# Patient Record
Sex: MALE | Race: WHITE | HISPANIC OR LATINO | Employment: FULL TIME | URBAN - METROPOLITAN AREA
[De-identification: names, ages, dates, MRNs, and addresses within clinical notes are randomized per-mention and may not be internally consistent; named-entity substitution may affect disease eponyms.]

---

## 2023-05-22 LAB
ALANINE AMINOTRANSFERASE (SGPT) (U/L) IN SER/PLAS: 24 U/L (ref 10–52)
ALBUMIN (G/DL) IN SER/PLAS: 4.1 G/DL (ref 3.4–5)
ALKALINE PHOSPHATASE (U/L) IN SER/PLAS: 66 U/L (ref 33–136)
ANION GAP IN SER/PLAS: 9 MMOL/L (ref 10–20)
ASPARTATE AMINOTRANSFERASE (SGOT) (U/L) IN SER/PLAS: 20 U/L (ref 9–39)
BASOPHILS (10*3/UL) IN BLOOD BY AUTOMATED COUNT: 0.09 X10E9/L (ref 0–0.1)
BASOPHILS/100 LEUKOCYTES IN BLOOD BY AUTOMATED COUNT: 1.3 % (ref 0–2)
BILIRUBIN TOTAL (MG/DL) IN SER/PLAS: 1.2 MG/DL (ref 0–1.2)
CALCIUM (MG/DL) IN SER/PLAS: 9 MG/DL (ref 8.6–10.3)
CARBON DIOXIDE, TOTAL (MMOL/L) IN SER/PLAS: 27 MMOL/L (ref 21–32)
CHLORIDE (MMOL/L) IN SER/PLAS: 103 MMOL/L (ref 98–107)
CHOLESTEROL (MG/DL) IN SER/PLAS: 139 MG/DL (ref 0–199)
CHOLESTEROL IN HDL (MG/DL) IN SER/PLAS: 56.6 MG/DL
CHOLESTEROL/HDL RATIO: 2.5
CREATININE (MG/DL) IN SER/PLAS: 1.02 MG/DL (ref 0.5–1.3)
EOSINOPHILS (10*3/UL) IN BLOOD BY AUTOMATED COUNT: 0.26 X10E9/L (ref 0–0.4)
EOSINOPHILS/100 LEUKOCYTES IN BLOOD BY AUTOMATED COUNT: 3.8 % (ref 0–6)
ERYTHROCYTE DISTRIBUTION WIDTH (RATIO) BY AUTOMATED COUNT: 13.1 % (ref 11.5–14.5)
ERYTHROCYTE MEAN CORPUSCULAR HEMOGLOBIN CONCENTRATION (G/DL) BY AUTOMATED: 32.7 G/DL (ref 32–36)
ERYTHROCYTE MEAN CORPUSCULAR VOLUME (FL) BY AUTOMATED COUNT: 86 FL (ref 80–100)
ERYTHROCYTES (10*6/UL) IN BLOOD BY AUTOMATED COUNT: 4.96 X10E12/L (ref 4.5–5.9)
ESTIMATED AVERAGE GLUCOSE FOR HBA1C: 146 MG/DL
GFR MALE: 77 ML/MIN/1.73M2
GLUCOSE (MG/DL) IN SER/PLAS: 164 MG/DL (ref 74–99)
HEMATOCRIT (%) IN BLOOD BY AUTOMATED COUNT: 42.8 % (ref 41–52)
HEMOGLOBIN (G/DL) IN BLOOD: 14 G/DL (ref 13.5–17.5)
HEMOGLOBIN A1C/HEMOGLOBIN TOTAL IN BLOOD: 6.7 %
IMMATURE GRANULOCYTES/100 LEUKOCYTES IN BLOOD BY AUTOMATED COUNT: 0.3 % (ref 0–0.9)
LDL: 62 MG/DL (ref 0–99)
LEUKOCYTES (10*3/UL) IN BLOOD BY AUTOMATED COUNT: 6.8 X10E9/L (ref 4.4–11.3)
LYMPHOCYTES (10*3/UL) IN BLOOD BY AUTOMATED COUNT: 2.19 X10E9/L (ref 0.8–3)
LYMPHOCYTES/100 LEUKOCYTES IN BLOOD BY AUTOMATED COUNT: 32.2 % (ref 13–44)
MONOCYTES (10*3/UL) IN BLOOD BY AUTOMATED COUNT: 0.61 X10E9/L (ref 0.05–0.8)
MONOCYTES/100 LEUKOCYTES IN BLOOD BY AUTOMATED COUNT: 9 % (ref 2–10)
NEUTROPHILS (10*3/UL) IN BLOOD BY AUTOMATED COUNT: 3.64 X10E9/L (ref 1.6–5.5)
NEUTROPHILS/100 LEUKOCYTES IN BLOOD BY AUTOMATED COUNT: 53.4 % (ref 40–80)
NRBC (PER 100 WBCS) BY AUTOMATED COUNT: 0 /100 WBC (ref 0–0)
PLATELETS (10*3/UL) IN BLOOD AUTOMATED COUNT: 209 X10E9/L (ref 150–450)
POTASSIUM (MMOL/L) IN SER/PLAS: 4.1 MMOL/L (ref 3.5–5.3)
PROSTATE SPECIFIC AG (NG/ML) IN SER/PLAS: 4.95 NG/ML (ref 0–4)
PROTEIN TOTAL: 7.2 G/DL (ref 6.4–8.2)
SODIUM (MMOL/L) IN SER/PLAS: 135 MMOL/L (ref 136–145)
TRIGLYCERIDE (MG/DL) IN SER/PLAS: 100 MG/DL (ref 0–149)
UREA NITROGEN (MG/DL) IN SER/PLAS: 17 MG/DL (ref 6–23)
VLDL: 20 MG/DL (ref 0–40)

## 2023-11-14 DIAGNOSIS — E11.9 TYPE 2 DIABETES MELLITUS WITHOUT COMPLICATION, WITHOUT LONG-TERM CURRENT USE OF INSULIN (MULTI): Primary | ICD-10-CM

## 2023-11-24 RX ORDER — METFORMIN HYDROCHLORIDE 500 MG/1
1000 TABLET ORAL 2 TIMES DAILY
Qty: 90 TABLET | Refills: 1 | Status: SHIPPED | OUTPATIENT
Start: 2023-11-24 | End: 2024-01-02 | Stop reason: SDUPTHER

## 2023-11-28 ENCOUNTER — OFFICE VISIT (OUTPATIENT)
Dept: PRIMARY CARE | Facility: CLINIC | Age: 73
End: 2023-11-28
Payer: MEDICARE

## 2023-11-28 ENCOUNTER — LAB (OUTPATIENT)
Dept: LAB | Facility: LAB | Age: 73
End: 2023-11-28
Payer: MEDICARE

## 2023-11-28 VITALS
HEIGHT: 68 IN | WEIGHT: 247.8 LBS | TEMPERATURE: 97.8 F | DIASTOLIC BLOOD PRESSURE: 79 MMHG | BODY MASS INDEX: 37.56 KG/M2 | SYSTOLIC BLOOD PRESSURE: 134 MMHG | HEART RATE: 68 BPM

## 2023-11-28 DIAGNOSIS — E66.01 OBESITY, MORBID (MULTI): ICD-10-CM

## 2023-11-28 DIAGNOSIS — E11.9 TYPE 2 DIABETES MELLITUS WITHOUT COMPLICATION, WITHOUT LONG-TERM CURRENT USE OF INSULIN (MULTI): ICD-10-CM

## 2023-11-28 DIAGNOSIS — N40.1 BPH WITH OBSTRUCTION/LOWER URINARY TRACT SYMPTOMS: ICD-10-CM

## 2023-11-28 DIAGNOSIS — Z00.00 WELL ADULT HEALTH CHECK: ICD-10-CM

## 2023-11-28 DIAGNOSIS — G62.89 OTHER POLYNEUROPATHY: ICD-10-CM

## 2023-11-28 DIAGNOSIS — E78.5 HYPERLIPIDEMIA, UNSPECIFIED HYPERLIPIDEMIA TYPE: ICD-10-CM

## 2023-11-28 DIAGNOSIS — K21.9 GERD WITHOUT ESOPHAGITIS: ICD-10-CM

## 2023-11-28 DIAGNOSIS — N13.8 BPH WITH OBSTRUCTION/LOWER URINARY TRACT SYMPTOMS: ICD-10-CM

## 2023-11-28 DIAGNOSIS — I73.9 PAD (PERIPHERAL ARTERY DISEASE) (CMS-HCC): ICD-10-CM

## 2023-11-28 DIAGNOSIS — M72.0 DUPUYTREN CONTRACTURE: ICD-10-CM

## 2023-11-28 DIAGNOSIS — R97.20 ELEVATED PSA: ICD-10-CM

## 2023-11-28 DIAGNOSIS — I10 PRIMARY HYPERTENSION: Primary | ICD-10-CM

## 2023-11-28 PROBLEM — K29.40 ATROPHIC GASTRITIS: Status: ACTIVE | Noted: 2023-11-28

## 2023-11-28 PROBLEM — D64.9 ANEMIA: Status: ACTIVE | Noted: 2023-11-28

## 2023-11-28 PROBLEM — N32.81 OAB (OVERACTIVE BLADDER): Status: ACTIVE | Noted: 2017-03-06

## 2023-11-28 PROBLEM — R33.9 URINE RETENTION: Status: ACTIVE | Noted: 2023-11-28

## 2023-11-28 PROBLEM — R07.81 PLEURODYNIA: Status: ACTIVE | Noted: 2023-11-28

## 2023-11-28 PROBLEM — R31.0 GROSS HEMATURIA: Status: ACTIVE | Noted: 2017-06-14

## 2023-11-28 PROBLEM — R39.16 STRAINING TO VOID: Status: ACTIVE | Noted: 2023-11-28

## 2023-11-28 PROBLEM — G62.9 PERIPHERAL NEUROPATHY: Status: ACTIVE | Noted: 2023-11-28

## 2023-11-28 LAB
ALBUMIN SERPL BCP-MCNC: 4.4 G/DL (ref 3.4–5)
ALP SERPL-CCNC: 89 U/L (ref 33–136)
ALT SERPL W P-5'-P-CCNC: 19 U/L (ref 10–52)
ANION GAP SERPL CALC-SCNC: 14 MMOL/L (ref 10–20)
AST SERPL W P-5'-P-CCNC: 14 U/L (ref 9–39)
BASOPHILS # BLD AUTO: 0.09 X10*3/UL (ref 0–0.1)
BASOPHILS NFR BLD AUTO: 1.1 %
BILIRUB SERPL-MCNC: 0.9 MG/DL (ref 0–1.2)
BUN SERPL-MCNC: 16 MG/DL (ref 6–23)
CALCIUM SERPL-MCNC: 9.5 MG/DL (ref 8.6–10.3)
CHLORIDE SERPL-SCNC: 100 MMOL/L (ref 98–107)
CHOLEST SERPL-MCNC: 140 MG/DL (ref 0–199)
CHOLESTEROL/HDL RATIO: 3.1
CO2 SERPL-SCNC: 26 MMOL/L (ref 21–32)
CREAT SERPL-MCNC: 1.08 MG/DL (ref 0.5–1.3)
EOSINOPHIL # BLD AUTO: 0.36 X10*3/UL (ref 0–0.4)
EOSINOPHIL NFR BLD AUTO: 4.4 %
ERYTHROCYTE [DISTWIDTH] IN BLOOD BY AUTOMATED COUNT: 12.9 % (ref 11.5–14.5)
EST. AVERAGE GLUCOSE BLD GHB EST-MCNC: 154 MG/DL
GFR SERPL CREATININE-BSD FRML MDRD: 72 ML/MIN/1.73M*2
GLUCOSE SERPL-MCNC: 153 MG/DL (ref 74–99)
HBA1C MFR BLD: 7 %
HCT VFR BLD AUTO: 44.6 % (ref 41–52)
HDLC SERPL-MCNC: 44.7 MG/DL
HGB BLD-MCNC: 14.7 G/DL (ref 13.5–17.5)
IMM GRANULOCYTES # BLD AUTO: 0.02 X10*3/UL (ref 0–0.5)
IMM GRANULOCYTES NFR BLD AUTO: 0.2 % (ref 0–0.9)
LDLC SERPL CALC-MCNC: 68 MG/DL
LYMPHOCYTES # BLD AUTO: 2.68 X10*3/UL (ref 0.8–3)
LYMPHOCYTES NFR BLD AUTO: 32.9 %
MCH RBC QN AUTO: 28.2 PG (ref 26–34)
MCHC RBC AUTO-ENTMCNC: 33 G/DL (ref 32–36)
MCV RBC AUTO: 86 FL (ref 80–100)
MONOCYTES # BLD AUTO: 0.75 X10*3/UL (ref 0.05–0.8)
MONOCYTES NFR BLD AUTO: 9.2 %
NEUTROPHILS # BLD AUTO: 4.24 X10*3/UL (ref 1.6–5.5)
NEUTROPHILS NFR BLD AUTO: 52.2 %
NON HDL CHOLESTEROL: 95 MG/DL (ref 0–149)
NRBC BLD-RTO: 0 /100 WBCS (ref 0–0)
PLATELET # BLD AUTO: 232 X10*3/UL (ref 150–450)
POTASSIUM SERPL-SCNC: 4.4 MMOL/L (ref 3.5–5.3)
PROT SERPL-MCNC: 7.6 G/DL (ref 6.4–8.2)
RBC # BLD AUTO: 5.21 X10*6/UL (ref 4.5–5.9)
SODIUM SERPL-SCNC: 136 MMOL/L (ref 136–145)
TRIGL SERPL-MCNC: 139 MG/DL (ref 0–149)
VLDL: 28 MG/DL (ref 0–40)
WBC # BLD AUTO: 8.1 X10*3/UL (ref 4.4–11.3)

## 2023-11-28 PROCEDURE — 3075F SYST BP GE 130 - 139MM HG: CPT | Performed by: INTERNAL MEDICINE

## 2023-11-28 PROCEDURE — 83036 HEMOGLOBIN GLYCOSYLATED A1C: CPT

## 2023-11-28 PROCEDURE — 3008F BODY MASS INDEX DOCD: CPT | Performed by: INTERNAL MEDICINE

## 2023-11-28 PROCEDURE — 1159F MED LIST DOCD IN RCRD: CPT | Performed by: INTERNAL MEDICINE

## 2023-11-28 PROCEDURE — 99214 OFFICE O/P EST MOD 30 MIN: CPT | Performed by: INTERNAL MEDICINE

## 2023-11-28 PROCEDURE — 84154 ASSAY OF PSA FREE: CPT

## 2023-11-28 PROCEDURE — 1036F TOBACCO NON-USER: CPT | Performed by: INTERNAL MEDICINE

## 2023-11-28 PROCEDURE — 3078F DIAST BP <80 MM HG: CPT | Performed by: INTERNAL MEDICINE

## 2023-11-28 PROCEDURE — 84153 ASSAY OF PSA TOTAL: CPT

## 2023-11-28 PROCEDURE — 3044F HG A1C LEVEL LT 7.0%: CPT | Performed by: INTERNAL MEDICINE

## 2023-11-28 PROCEDURE — 36415 COLL VENOUS BLD VENIPUNCTURE: CPT

## 2023-11-28 PROCEDURE — 85025 COMPLETE CBC W/AUTO DIFF WBC: CPT

## 2023-11-28 PROCEDURE — 80053 COMPREHEN METABOLIC PANEL: CPT

## 2023-11-28 PROCEDURE — 80061 LIPID PANEL: CPT

## 2023-11-28 RX ORDER — TAMSULOSIN HYDROCHLORIDE 0.4 MG/1
0.4 CAPSULE ORAL DAILY
COMMUNITY
End: 2024-01-02 | Stop reason: SDUPTHER

## 2023-11-28 RX ORDER — ATORVASTATIN CALCIUM 10 MG/1
10 TABLET, FILM COATED ORAL DAILY
COMMUNITY
End: 2024-01-02 | Stop reason: SDUPTHER

## 2023-11-28 RX ORDER — BLOOD SUGAR DIAGNOSTIC
STRIP MISCELLANEOUS
COMMUNITY
End: 2024-02-19 | Stop reason: SDUPTHER

## 2023-11-28 RX ORDER — ACETAMINOPHEN 325 MG/1
325 TABLET ORAL EVERY 8 HOURS PRN
COMMUNITY

## 2023-11-28 RX ORDER — GLIMEPIRIDE 2 MG/1
2 TABLET ORAL DAILY
COMMUNITY
End: 2024-03-04 | Stop reason: SDUPTHER

## 2023-11-28 RX ORDER — AMLODIPINE BESYLATE 5 MG/1
5 TABLET ORAL DAILY
COMMUNITY
End: 2024-01-02 | Stop reason: SDUPTHER

## 2023-11-28 RX ORDER — GABAPENTIN 300 MG/1
300 CAPSULE ORAL 3 TIMES DAILY
COMMUNITY
End: 2024-01-02 | Stop reason: SDUPTHER

## 2023-11-28 ASSESSMENT — PATIENT HEALTH QUESTIONNAIRE - PHQ9
1. LITTLE INTEREST OR PLEASURE IN DOING THINGS: NOT AT ALL
SUM OF ALL RESPONSES TO PHQ9 QUESTIONS 1 AND 2: 0
2. FEELING DOWN, DEPRESSED OR HOPELESS: NOT AT ALL

## 2023-11-28 NOTE — PROGRESS NOTES
Assessment/Plan   Problem List Items Addressed This Visit       BPH with obstruction/lower urinary tract symptoms    Diabetes mellitus (CMS/[facility])    Relevant Orders    Hemoglobin A1C    Dupuytren contracture    Elevated PSA    Relevant Orders    PSA, Total and Free    GERD without esophagitis    Relevant Orders    CBC and Auto Differential    Comprehensive Metabolic Panel    Hyperlipidemia    Relevant Orders    Comprehensive Metabolic Panel    Lipid Panel    Hypertension - Primary    Peripheral neuropathy    Relevant Orders    Comprehensive Metabolic Panel    Obesity, morbid (CMS/HCC)    Relevant Orders    Comprehensive Metabolic Panel    Well adult health check    Relevant Orders    Comprehensive Metabolic Panel    BMI 38.0-38.9,adult   Advised to have a blood work which is due he was supposed to get it done but did not get it done  He has been doing well we discussed all the current conditions his Dupuytren contracture is a stable  His peripheral neuropathic symptoms are under control with the current treatment  He has not been to the eye doctor discussed the eye exam for the diabetes purpose is very important  His BMI is elevated but the weight has not gone up rather than down this is a reasonable thing to do  His PSA is elevated and a year ago it was 12 or so but came down to four-point something that is good and therefore we will repeat the PSA to assess  Follow-up in 4 to 5 months unless otherwise indicated by the blood test  No change in the medications    Subjective   Patient ID: Kameron Candelaria is a 73 y.o. male who presents for Follow-up.    Past Surgical History:   Procedure Laterality Date    OTHER SURGICAL HISTORY  02/06/2022    Colonoscopy    OTHER SURGICAL HISTORY  01/25/2022    Hand surgery      No family history on file.   Social History     Socioeconomic History    Marital status:      Spouse name: Not on file    Number of children: Not on file    Years of education: Not on file    Highest  "education level: Not on file   Occupational History    Not on file   Tobacco Use    Smoking status: Never    Smokeless tobacco: Never   Substance and Sexual Activity    Alcohol use: Not Currently    Drug use: Never    Sexual activity: Not on file   Other Topics Concern    Not on file   Social History Narrative    Not on file     Social Determinants of Health     Financial Resource Strain: Not on file   Food Insecurity: Not on file   Transportation Needs: Not on file   Physical Activity: Not on file   Stress: Not on file   Social Connections: Not on file   Intimate Partner Violence: Not on file   Housing Stability: Not on file      Patient has no known allergies.   Current Outpatient Medications   Medication Sig Dispense Refill    metFORMIN (Glucophage) 500 mg tablet TAKE TWO TABLETS BY MOUTH TWO TIMES A DAY 90 tablet 1    acetaminophen (TylenoL) 325 mg tablet Take 1 tablet (325 mg) by mouth every 8 hours if needed for moderate pain (4 - 6).      amLODIPine (Norvasc) 5 mg tablet Take 1 tablet (5 mg) by mouth once daily.      atorvastatin (Lipitor) 10 mg tablet Take 1 tablet (10 mg) by mouth once daily.      gabapentin (Neurontin) 300 mg capsule Take 1 capsule (300 mg) by mouth 3 times a day.      glimepiride (Amaryl) 2 mg tablet Take 1 tablet (2 mg) by mouth once daily.      OneTouch Ultra Test strip once daily.      tamsulosin (Flomax) 0.4 mg 24 hr capsule Take 1 capsule (0.4 mg) by mouth once daily.       No current facility-administered medications for this visit.      Vitals:    11/28/23 0846   BP: 134/79   BP Location: Left arm   Patient Position: Sitting   Pulse: 68   Temp: 36.6 °C (97.8 °F)   Weight: 112 kg (247 lb 12.8 oz)   Height: 1.715 m (5' 7.5\")      Problem List Items Addressed This Visit       BPH with obstruction/lower urinary tract symptoms    Diabetes mellitus (CMS/HCC)    Relevant Orders    Hemoglobin A1C    Dupuytren contracture    Elevated PSA    Relevant Orders    PSA, Total and Free    GERD " "without esophagitis    Relevant Orders    CBC and Auto Differential    Comprehensive Metabolic Panel    Hyperlipidemia    Relevant Orders    Comprehensive Metabolic Panel    Lipid Panel    Hypertension - Primary    Peripheral neuropathy    Relevant Orders    Comprehensive Metabolic Panel    Obesity, morbid (CMS/HCC)    Relevant Orders    Comprehensive Metabolic Panel    Well adult health check    Relevant Orders    Comprehensive Metabolic Panel    BMI 38.0-38.9,adult      Orders Placed This Encounter   Procedures    CBC and Auto Differential     Standing Status:   Future     Standing Expiration Date:   11/28/2024     Order Specific Question:   Release result to MyChart     Answer:   Immediate    Comprehensive Metabolic Panel     Standing Status:   Future     Standing Expiration Date:   11/28/2024     Order Specific Question:   Release result to MyChart     Answer:   Immediate    Hemoglobin A1C     Standing Status:   Future     Standing Expiration Date:   11/28/2024     Order Specific Question:   Release result to MyChart     Answer:   Immediate    Lipid Panel     Standing Status:   Future     Standing Expiration Date:   11/28/2024     Order Specific Question:   Release result to [employer]     Answer:   Immediate    PSA, Total and Free     Standing Status:   Future     Standing Expiration Date:   11/28/2024     Order Specific Question:   Release result to [employer]     Answer:   Immediate [1]        HPI  Came for periodic review and follow-up  No complaint  Things under control  Has not been to the eye doctor      ROS negative    PHYSICAL EXAM  Dupuytren contracture in both palmar aspect unchanged  Cardiovascular chest abdominal neurological examination is normal  Previous labs reviewed  Further labs as ordered      No results found for: \"PR1\", \"BMPR1A\", \"CMPLAS\", \"UR1PHZCM\", \"KPSAT\"   Lab Results   Component Value Date    CHOL 139 05/22/2023    CHHDL 2.5 05/22/2023                "

## 2023-11-30 LAB
PSA FREE MFR SERPL: 22 %
PSA FREE SERPL-MCNC: 1.9 NG/ML
PSA SERPL IA-MCNC: 8.6 NG/ML (ref 0–4)

## 2023-12-06 ENCOUNTER — TELEPHONE (OUTPATIENT)
Dept: PRIMARY CARE | Facility: CLINIC | Age: 73
End: 2023-12-06

## 2023-12-06 NOTE — TELEPHONE ENCOUNTER
----- Message from Griselda Stone MA sent at 12/4/2023 11:13 AM EST -----    ----- Message -----  From: Sebastian Connelly MD  Sent: 11/30/2023   6:05 PM EST  To: [id number] Clinical Support Staff    Tell psa has gone up see urologist

## 2024-01-02 ENCOUNTER — TELEPHONE (OUTPATIENT)
Dept: PRIMARY CARE | Facility: CLINIC | Age: 74
End: 2024-01-02
Payer: COMMERCIAL

## 2024-01-02 DIAGNOSIS — N13.8 BPH WITH OBSTRUCTION/LOWER URINARY TRACT SYMPTOMS: Primary | ICD-10-CM

## 2024-01-02 DIAGNOSIS — N40.1 BPH WITH OBSTRUCTION/LOWER URINARY TRACT SYMPTOMS: Primary | ICD-10-CM

## 2024-01-02 DIAGNOSIS — E78.5 HYPERLIPIDEMIA, UNSPECIFIED HYPERLIPIDEMIA TYPE: ICD-10-CM

## 2024-01-02 DIAGNOSIS — E11.9 TYPE 2 DIABETES MELLITUS WITHOUT COMPLICATION, WITHOUT LONG-TERM CURRENT USE OF INSULIN (MULTI): ICD-10-CM

## 2024-01-02 DIAGNOSIS — I10 PRIMARY HYPERTENSION: ICD-10-CM

## 2024-01-02 RX ORDER — AMLODIPINE BESYLATE 5 MG/1
5 TABLET ORAL DAILY
Qty: 90 TABLET | Refills: 0 | Status: SHIPPED | OUTPATIENT
Start: 2024-01-02 | End: 2024-02-13

## 2024-01-02 RX ORDER — ATORVASTATIN CALCIUM 10 MG/1
10 TABLET, FILM COATED ORAL DAILY
Qty: 90 TABLET | Refills: 0 | Status: SHIPPED | OUTPATIENT
Start: 2024-01-02 | End: 2024-02-13

## 2024-01-02 RX ORDER — METFORMIN HYDROCHLORIDE 500 MG/1
1000 TABLET ORAL 2 TIMES DAILY
Qty: 90 TABLET | Refills: 1 | Status: SHIPPED | OUTPATIENT
Start: 2024-01-02 | End: 2024-02-29 | Stop reason: SDUPTHER

## 2024-01-02 RX ORDER — TAMSULOSIN HYDROCHLORIDE 0.4 MG/1
0.4 CAPSULE ORAL DAILY
Qty: 90 CAPSULE | Refills: 0 | Status: SHIPPED | OUTPATIENT
Start: 2024-01-02 | End: 2024-03-04 | Stop reason: SDUPTHER

## 2024-01-02 RX ORDER — GABAPENTIN 300 MG/1
300 CAPSULE ORAL 3 TIMES DAILY
Qty: 90 CAPSULE | Refills: 0 | Status: SHIPPED | OUTPATIENT
Start: 2024-01-02 | End: 2024-02-01

## 2024-01-02 NOTE — TELEPHONE ENCOUNTER
Pt requesting rx refill for tamsulosin .4mg, amlodipine 5mg, atorvastatin calcium 10mg,  gabapentin 300mg and metformin 500mg to  pharmacy/[facility]. Pt has pending appt 2/19/24.

## 2024-01-18 ENCOUNTER — LAB (OUTPATIENT)
Dept: LAB | Facility: LAB | Age: 74
End: 2024-01-18
Payer: COMMERCIAL

## 2024-01-18 ENCOUNTER — OFFICE VISIT (OUTPATIENT)
Dept: UROLOGY | Facility: CLINIC | Age: 74
End: 2024-01-18
Payer: COMMERCIAL

## 2024-01-18 VITALS
HEART RATE: 76 BPM | WEIGHT: 249 LBS | TEMPERATURE: 97.8 F | BODY MASS INDEX: 38.42 KG/M2 | SYSTOLIC BLOOD PRESSURE: 162 MMHG | DIASTOLIC BLOOD PRESSURE: 85 MMHG

## 2024-01-18 DIAGNOSIS — R97.20 ELEVATED PSA: ICD-10-CM

## 2024-01-18 LAB
CREAT SERPL-MCNC: 1.07 MG/DL (ref 0.5–1.3)
EGFRCR SERPLBLD CKD-EPI 2021: 73 ML/MIN/1.73M*2

## 2024-01-18 PROCEDURE — 3008F BODY MASS INDEX DOCD: CPT | Performed by: UROLOGY

## 2024-01-18 PROCEDURE — 99203 OFFICE O/P NEW LOW 30 MIN: CPT | Performed by: UROLOGY

## 2024-01-18 PROCEDURE — 82565 ASSAY OF CREATININE: CPT

## 2024-01-18 PROCEDURE — 3079F DIAST BP 80-89 MM HG: CPT | Performed by: UROLOGY

## 2024-01-18 PROCEDURE — 3077F SYST BP >= 140 MM HG: CPT | Performed by: UROLOGY

## 2024-01-18 PROCEDURE — 1036F TOBACCO NON-USER: CPT | Performed by: UROLOGY

## 2024-01-18 PROCEDURE — 1159F MED LIST DOCD IN RCRD: CPT | Performed by: UROLOGY

## 2024-01-18 PROCEDURE — 36415 COLL VENOUS BLD VENIPUNCTURE: CPT

## 2024-01-18 NOTE — PROGRESS NOTES
Subjective   Patient ID: Kameron Candelaria is a 73 y.o. male new patient kindly referred by Dr. Sebastian Connelly who presents for Elevated PSA.    HPI  Most recent PSA is 8.6 in November 2023 and has had a value >4 since 2021.  English is not the patient's first language and may not be fully understanding. Once in a while the patient takes some pills (tamsulosin). Patient states his urine flow is fine and denies hematuria, dysuria. He admits he has occasional urgency. He has nocturia every 2-3 hours. When he takes the tamsulosin, then he will have nocturia every 3-4 hours. He states he will take tamsulosin for a few days and then stop taking it for several weeks. He denies a family history of prostate cancer.     He denies a pacemaker or other reason he might not have an MRI.    Past Medical History  Past Medical History:   Diagnosis Date    Benign prostatic hyperplasia with lower urinary tract symptoms     Enlarged prostate with lower urinary tract symptoms (LUTS)    Overweight     Overweight    Personal history of other diseases of urinary system     History of hematuria    Personal history of other endocrine, nutritional and metabolic disease     History of morbid obesity    Personal history of other specified conditions     History of fatigue    Presence of other specified devices 02/07/2022    Indwelling Hull catheter present    Tinea unguium     Onychomycosis of toenail        Surgical History  Past Surgical History:   Procedure Laterality Date    OTHER SURGICAL HISTORY  02/06/2022    Colonoscopy    OTHER SURGICAL HISTORY  01/25/2022    Hand surgery         Social History  He reports that he has never smoked. He has never used smokeless tobacco. He reports that he does not currently use alcohol. He reports that he does not use drugs.    Family History  No family history on file.    Medications    Current Outpatient Medications:     acetaminophen (TylenoL) 325 mg tablet, Take 1 tablet (325 mg) by mouth every 8 hours  if needed for moderate pain (4 - 6)., Disp: , Rfl:     amLODIPine (Norvasc) 5 mg tablet, Take 1 tablet (5 mg) by mouth once daily., Disp: 90 tablet, Rfl: 0    atorvastatin (Lipitor) 10 mg tablet, Take 1 tablet (10 mg) by mouth once daily., Disp: 90 tablet, Rfl: 0    gabapentin (Neurontin) 300 mg capsule, Take 1 capsule (300 mg) by mouth 3 times a day., Disp: 90 capsule, Rfl: 0    glimepiride (Amaryl) 2 mg tablet, Take 1 tablet (2 mg) by mouth once daily., Disp: , Rfl:     metFORMIN (Glucophage) 500 mg tablet, Take 2 tablets (1,000 mg) by mouth 2 times a day., Disp: 90 tablet, Rfl: 1    OneTouch Ultra Test strip, once daily., Disp: , Rfl:     tamsulosin (Flomax) 0.4 mg 24 hr capsule, Take 1 capsule (0.4 mg) by mouth once daily., Disp: 90 capsule, Rfl: 0     Allergies  Patient has no allergy information on record.     Review of Systems  A 12 system review was completed and is negative with the exception of those signs and symptoms noted in the history of present illness.    Objective   Physical Exam  General: in NAD, appears stated age  Head: normocephalic, atraumatic  Neck: supple; trachea is midline  Respiratory: normal effort, no use of accessory muscles  Cardiovascular: no peripheral edema  Abdomen: soft, nondistended, nontender, no rebound or guarding, no organomegaly, no CVA tenderness, no hernia  Lymphatic: no lymphadenopathy noted  Skin: normal turgor, no rashes  Neurologic: grossly intact, oriented to person/place/time  Psychiatric: mode and affect appropriate  : normal phallus, normal meatus, scrotum normal, testicles normal bilaterally, epididymis normal bilaterally  GIANA: normal tone, no hemorrhoids, enlarged prostate smooth/nontender/no nodules    Assessment/Plan     We discussed the role of PSA in screening for prostate cancer. I recommend that we schedule an MRI of the prostate to see if there are any lesions visible. If there is a lesion detected, it will facilitate a targeted biopsy. If there is  nothing found on the MRI, that does not rule out a cancer. We can still investigate further with a general IO biopsy. I recommended that he take the tamsulosin daily to reduce his urinary symptoms. Schedule MRI and follow-up with results.     Scribe Attestation  By signing my name below, I, Cherie Tim   attest that this documentation has been prepared under the direction and in the presence of Wilfredo Reyes MD.

## 2024-01-19 ENCOUNTER — TELEPHONE (OUTPATIENT)
Dept: CARDIOLOGY | Facility: CLINIC | Age: 74
End: 2024-01-19
Payer: COMMERCIAL

## 2024-02-07 ENCOUNTER — HOSPITAL ENCOUNTER (OUTPATIENT)
Dept: RADIOLOGY | Facility: CLINIC | Age: 74
Discharge: HOME | End: 2024-02-07
Payer: COMMERCIAL

## 2024-02-07 DIAGNOSIS — R97.20 ELEVATED PSA: ICD-10-CM

## 2024-02-09 DIAGNOSIS — E11.9 TYPE 2 DIABETES MELLITUS WITHOUT COMPLICATION, WITHOUT LONG-TERM CURRENT USE OF INSULIN (MULTI): ICD-10-CM

## 2024-02-09 DIAGNOSIS — E78.5 HYPERLIPIDEMIA, UNSPECIFIED HYPERLIPIDEMIA TYPE: ICD-10-CM

## 2024-02-09 DIAGNOSIS — I10 PRIMARY HYPERTENSION: ICD-10-CM

## 2024-02-13 RX ORDER — AMLODIPINE BESYLATE 5 MG/1
5 TABLET ORAL DAILY
Qty: 90 TABLET | Refills: 1 | Status: SHIPPED | OUTPATIENT
Start: 2024-02-13 | End: 2024-03-04 | Stop reason: SDUPTHER

## 2024-02-13 RX ORDER — ATORVASTATIN CALCIUM 10 MG/1
10 TABLET, FILM COATED ORAL DAILY
Qty: 90 TABLET | Refills: 1 | Status: SHIPPED | OUTPATIENT
Start: 2024-02-13 | End: 2024-03-04 | Stop reason: SDUPTHER

## 2024-02-19 ENCOUNTER — OFFICE VISIT (OUTPATIENT)
Dept: PRIMARY CARE | Facility: CLINIC | Age: 74
End: 2024-02-19
Payer: COMMERCIAL

## 2024-02-19 VITALS
TEMPERATURE: 97.2 F | WEIGHT: 252 LBS | DIASTOLIC BLOOD PRESSURE: 70 MMHG | HEART RATE: 72 BPM | BODY MASS INDEX: 38.19 KG/M2 | SYSTOLIC BLOOD PRESSURE: 134 MMHG | HEIGHT: 68 IN

## 2024-02-19 DIAGNOSIS — E11.9 TYPE 2 DIABETES MELLITUS WITHOUT COMPLICATION, WITHOUT LONG-TERM CURRENT USE OF INSULIN (MULTI): ICD-10-CM

## 2024-02-19 DIAGNOSIS — G62.89 OTHER POLYNEUROPATHY: ICD-10-CM

## 2024-02-19 DIAGNOSIS — E78.5 HYPERLIPIDEMIA, UNSPECIFIED HYPERLIPIDEMIA TYPE: ICD-10-CM

## 2024-02-19 DIAGNOSIS — R97.20 ELEVATED PSA: ICD-10-CM

## 2024-02-19 DIAGNOSIS — I10 PRIMARY HYPERTENSION: Primary | ICD-10-CM

## 2024-02-19 DIAGNOSIS — Z00.00 WELL ADULT HEALTH CHECK: ICD-10-CM

## 2024-02-19 DIAGNOSIS — E66.01 OBESITY, MORBID (MULTI): ICD-10-CM

## 2024-02-19 PROBLEM — B35.1 ONYCHOMYCOSIS OF TOENAIL: Status: ACTIVE | Noted: 2024-02-19

## 2024-02-19 PROBLEM — Z86.39 HISTORY OF OBESITY: Status: ACTIVE | Noted: 2024-02-19

## 2024-02-19 PROBLEM — E66.3 OVERWEIGHT: Status: ACTIVE | Noted: 2024-02-19

## 2024-02-19 PROCEDURE — 1036F TOBACCO NON-USER: CPT | Performed by: INTERNAL MEDICINE

## 2024-02-19 PROCEDURE — 99214 OFFICE O/P EST MOD 30 MIN: CPT | Performed by: INTERNAL MEDICINE

## 2024-02-19 PROCEDURE — 3008F BODY MASS INDEX DOCD: CPT | Performed by: INTERNAL MEDICINE

## 2024-02-19 PROCEDURE — 3075F SYST BP GE 130 - 139MM HG: CPT | Performed by: INTERNAL MEDICINE

## 2024-02-19 PROCEDURE — 3078F DIAST BP <80 MM HG: CPT | Performed by: INTERNAL MEDICINE

## 2024-02-19 PROCEDURE — 1159F MED LIST DOCD IN RCRD: CPT | Performed by: INTERNAL MEDICINE

## 2024-02-19 RX ORDER — BLOOD SUGAR DIAGNOSTIC
1 STRIP MISCELLANEOUS DAILY
Qty: 100 EACH | Refills: 1 | Status: SHIPPED | OUTPATIENT
Start: 2024-02-19

## 2024-02-19 RX ORDER — LANCETS 33 GAUGE
1 EACH MISCELLANEOUS DAILY
Qty: 100 EACH | Refills: 1 | Status: SHIPPED | OUTPATIENT
Start: 2024-02-19

## 2024-02-19 RX ORDER — LANCETS 33 GAUGE
1 EACH MISCELLANEOUS 2 TIMES DAILY
COMMUNITY
End: 2024-02-19 | Stop reason: SDUPTHER

## 2024-02-19 ASSESSMENT — PATIENT HEALTH QUESTIONNAIRE - PHQ9
SUM OF ALL RESPONSES TO PHQ9 QUESTIONS 1 AND 2: 0
2. FEELING DOWN, DEPRESSED OR HOPELESS: NOT AT ALL
1. LITTLE INTEREST OR PLEASURE IN DOING THINGS: NOT AT ALL

## 2024-02-19 NOTE — PROGRESS NOTES
Assessment/Plan   Problem List Items Addressed This Visit       Diabetes mellitus (CMS/HCC)    Relevant Medications    OneTouch Ultra Test strip    lancets 33 gauge misc    Other Relevant Orders    Comprehensive Metabolic Panel    Hemoglobin A1C    Elevated PSA    Hyperlipidemia    Relevant Orders    Comprehensive Metabolic Panel    Lipid Panel    TSH with reflex to Free T4 if abnormal    Hypertension - Primary    Peripheral neuropathy    Obesity, morbid (CMS/HCC)    Relevant Orders    Comprehensive Metabolic Panel    TSH with reflex to Free T4 if abnormal    Well adult health check    Relevant Orders    Comprehensive Metabolic Panel   His peripheral neuropathy has been not very well responding to the current dose of gabapentin  I advised to seek neurologist opinion and EMG may be needed  There will be issue associated with cervical radiculopathy and lumbar radiculopathy along with a peripheral polyneuropathy with the diabetes  He did not want to go and see any other doctor want to continue the current treatment  He will try to do diet and exercise and lose some weight that will be helpful in all modalities  His hypertension is under control  He has elevated PSA and is being taken care of by urologist and MRI is being planned  His diabetes comprehensive care was discussed  Labs in 3 months and follow-up    Subjective   Patient ID: Kameron Candelaria is a 73 y.o. male who presents for Follow-up.    Past Surgical History:   Procedure Laterality Date    OTHER SURGICAL HISTORY  02/06/2022    Colonoscopy    OTHER SURGICAL HISTORY  01/25/2022    Hand surgery      Family History   Problem Relation Name Age of Onset    No Known Problems Mother      No Known Problems Father        Social History     Socioeconomic History    Marital status:      Spouse name: Not on file    Number of children: Not on file    Years of education: Not on file    Highest education level: Not on file   Occupational History    Not on file   Tobacco  "Use    Smoking status: Never    Smokeless tobacco: Never   Substance and Sexual Activity    Alcohol use: Yes     Alcohol/week: 2.0 - 3.0 standard drinks of alcohol     Types: 2 - 3 Cans of beer per week    Drug use: Never    Sexual activity: Not on file   Other Topics Concern    Not on file   Social History Narrative    Not on file     Social Determinants of Health     Financial Resource Strain: Not on file   Food Insecurity: Not on file   Transportation Needs: Not on file   Physical Activity: Not on file   Stress: Not on file   Social Connections: Not on file   Intimate Partner Violence: Not on file   Housing Stability: Not on file      Patient has no known allergies.   Current Outpatient Medications   Medication Sig Dispense Refill    acetaminophen (TylenoL) 325 mg tablet Take 1 tablet (325 mg) by mouth every 8 hours if needed for moderate pain (4 - 6).      amLODIPine (Norvasc) 5 mg tablet TAKE ONE TABLET BY MOUTH DAILY 90 tablet 1    atorvastatin (Lipitor) 10 mg tablet TAKE ONE TABLET BY MOUTH DAILY AS DIRECTED 90 tablet 1    glimepiride (Amaryl) 2 mg tablet Take 1 tablet (2 mg) by mouth once daily.      metFORMIN (Glucophage) 500 mg tablet Take 2 tablets (1,000 mg) by mouth 2 times a day. 90 tablet 1    tamsulosin (Flomax) 0.4 mg 24 hr capsule Take 1 capsule (0.4 mg) by mouth once daily. 90 capsule 0    gabapentin (Neurontin) 300 mg capsule Take 1 capsule (300 mg) by mouth 3 times a day. 90 capsule 0    lancets 33 gauge misc 1 Lancet once daily. May provide what insurance will cover. 100 each 1    OneTouch Ultra Test strip 1 strip by Does not apply route once daily. 100 each 1     No current facility-administered medications for this visit.      Vitals:    02/19/24 0833 02/19/24 0912   BP: 151/80 134/70   BP Location: Right arm Right arm   Patient Position: Sitting Sitting   Pulse: 72    Temp: 36.2 °C (97.2 °F)    Weight: 114 kg (252 lb)    Height: 1.715 m (5' 7.5\")       Problem List Items Addressed This Visit "       Diabetes mellitus (CMS/HCC)    Relevant Medications    OneTouch Ultra Test strip    lancets 33 gauge misc    Other Relevant Orders    Comprehensive Metabolic Panel    Hemoglobin A1C    Elevated PSA    Hyperlipidemia    Relevant Orders    Comprehensive Metabolic Panel    Lipid Panel    TSH with reflex to Free T4 if abnormal    Hypertension - Primary    Peripheral neuropathy    Obesity, morbid (CMS/HCC)    Relevant Orders    Comprehensive Metabolic Panel    TSH with reflex to Free T4 if abnormal    Well adult health check    Relevant Orders    Comprehensive Metabolic Panel      Orders Placed This Encounter   Procedures    Comprehensive Metabolic Panel     Standing Status:   Future     Standing Expiration Date:   2/19/2025     Order Specific Question:   Release result to MyChart     Answer:   Immediate    Hemoglobin A1C     Standing Status:   Future     Standing Expiration Date:   2/19/2025     Order Specific Question:   Release result to MyChart     Answer:   Immediate    Lipid Panel     Standing Status:   Future     Standing Expiration Date:   2/19/2025     Order Specific Question:   Release result to MyChart     Answer:   Immediate    TSH with reflex to Free T4 if abnormal     Standing Status:   Future     Standing Expiration Date:   2/19/2025     Order Specific Question:   Release result to [employer]     Answer:   Immediate        HPI  Came for periodic review  Doing well  MRI of the prostate is pending  He continues to have symptoms of neuropathy in both finger as well as in the toes and using gabapentin  Not very much satisfied though    ROS  Negative otherwise  Past medical history reviewed  Social and family history reviewed  Allergies and medications reviewed  Recent labs reviewed  Vital signs reviewed    PHYSICAL EXAM  Cardiovascular chest abdominal neurological examination is normal     Results for orders placed or performed in visit on 01/18/24   Creatinine   Result Value Ref Range    Creatinine 1.07  "0.50 - 1.30 mg/dL    eGFR 73 >60 mL/min/1.73m*2           No results found for: \"PR1\", \"BMPR1A\", \"CMPLAS\", \"IO3XMCLP\", \"KPSAT\"   Lab Results   Component Value Date    CHOL 140 11/28/2023    LDLCALC 68 11/28/2023    CHHDL 3.1 11/28/2023                "

## 2024-02-22 ENCOUNTER — HOSPITAL ENCOUNTER (OUTPATIENT)
Dept: RADIOLOGY | Facility: CLINIC | Age: 74
Discharge: HOME | End: 2024-02-22
Payer: COMMERCIAL

## 2024-02-22 PROCEDURE — A9575 INJ GADOTERATE MEGLUMI 0.1ML: HCPCS | Performed by: UROLOGY

## 2024-02-22 PROCEDURE — 72197 MRI PELVIS W/O & W/DYE: CPT

## 2024-02-22 PROCEDURE — 2550000001 HC RX 255 CONTRASTS: Performed by: UROLOGY

## 2024-02-22 PROCEDURE — 72197 MRI PELVIS W/O & W/DYE: CPT | Performed by: RADIOLOGY

## 2024-02-22 RX ORDER — GADOTERATE MEGLUMINE 376.9 MG/ML
20 INJECTION INTRAVENOUS
Status: COMPLETED | OUTPATIENT
Start: 2024-02-22 | End: 2024-02-22

## 2024-02-22 RX ADMIN — GADOTERATE MEGLUMINE 20 ML: 376.9 INJECTION INTRAVENOUS at 14:46

## 2024-02-29 DIAGNOSIS — E11.9 TYPE 2 DIABETES MELLITUS WITHOUT COMPLICATION, WITHOUT LONG-TERM CURRENT USE OF INSULIN (MULTI): ICD-10-CM

## 2024-02-29 RX ORDER — METFORMIN HYDROCHLORIDE 500 MG/1
1000 TABLET ORAL 2 TIMES DAILY
Qty: 90 TABLET | Refills: 1 | Status: SHIPPED | OUTPATIENT
Start: 2024-02-29 | End: 2024-03-04 | Stop reason: SDUPTHER

## 2024-03-02 DIAGNOSIS — E11.9 TYPE 2 DIABETES MELLITUS WITHOUT COMPLICATION, WITHOUT LONG-TERM CURRENT USE OF INSULIN (MULTI): ICD-10-CM

## 2024-03-04 ENCOUNTER — OFFICE VISIT (OUTPATIENT)
Dept: PRIMARY CARE | Facility: CLINIC | Age: 74
End: 2024-03-04
Payer: COMMERCIAL

## 2024-03-04 VITALS
HEART RATE: 88 BPM | WEIGHT: 252.4 LBS | TEMPERATURE: 97.8 F | SYSTOLIC BLOOD PRESSURE: 158 MMHG | BODY MASS INDEX: 38.25 KG/M2 | DIASTOLIC BLOOD PRESSURE: 83 MMHG | HEIGHT: 68 IN

## 2024-03-04 DIAGNOSIS — E11.9 TYPE 2 DIABETES MELLITUS WITHOUT COMPLICATION, WITHOUT LONG-TERM CURRENT USE OF INSULIN (MULTI): ICD-10-CM

## 2024-03-04 DIAGNOSIS — L60.3 ONYCHODYSTROPHY: ICD-10-CM

## 2024-03-04 DIAGNOSIS — I10 PRIMARY HYPERTENSION: ICD-10-CM

## 2024-03-04 DIAGNOSIS — N40.1 BPH WITH OBSTRUCTION/LOWER URINARY TRACT SYMPTOMS: ICD-10-CM

## 2024-03-04 DIAGNOSIS — E78.5 HYPERLIPIDEMIA, UNSPECIFIED HYPERLIPIDEMIA TYPE: ICD-10-CM

## 2024-03-04 DIAGNOSIS — L03.032 ACUTE PARONYCHIA OF TOE OF LEFT FOOT: Primary | ICD-10-CM

## 2024-03-04 DIAGNOSIS — N13.8 BPH WITH OBSTRUCTION/LOWER URINARY TRACT SYMPTOMS: ICD-10-CM

## 2024-03-04 PROCEDURE — 3079F DIAST BP 80-89 MM HG: CPT | Performed by: INTERNAL MEDICINE

## 2024-03-04 PROCEDURE — 3077F SYST BP >= 140 MM HG: CPT | Performed by: INTERNAL MEDICINE

## 2024-03-04 PROCEDURE — 1159F MED LIST DOCD IN RCRD: CPT | Performed by: INTERNAL MEDICINE

## 2024-03-04 PROCEDURE — 99214 OFFICE O/P EST MOD 30 MIN: CPT | Performed by: INTERNAL MEDICINE

## 2024-03-04 PROCEDURE — 1036F TOBACCO NON-USER: CPT | Performed by: INTERNAL MEDICINE

## 2024-03-04 PROCEDURE — 3008F BODY MASS INDEX DOCD: CPT | Performed by: INTERNAL MEDICINE

## 2024-03-04 RX ORDER — AMLODIPINE BESYLATE 5 MG/1
5 TABLET ORAL DAILY
Qty: 90 TABLET | Refills: 1 | Status: SHIPPED | OUTPATIENT
Start: 2024-03-04

## 2024-03-04 RX ORDER — DOXYCYCLINE HYCLATE 100 MG
100 TABLET ORAL 2 TIMES DAILY
COMMUNITY
Start: 2024-03-01

## 2024-03-04 RX ORDER — AMOXICILLIN AND CLAVULANATE POTASSIUM 500; 125 MG/1; MG/1
500 TABLET, FILM COATED ORAL 3 TIMES DAILY
Qty: 30 TABLET | Refills: 0 | Status: SHIPPED | OUTPATIENT
Start: 2024-03-04 | End: 2024-03-14

## 2024-03-04 RX ORDER — ATORVASTATIN CALCIUM 10 MG/1
10 TABLET, FILM COATED ORAL DAILY
Qty: 90 TABLET | Refills: 1 | Status: SHIPPED | OUTPATIENT
Start: 2024-03-04

## 2024-03-04 RX ORDER — METFORMIN HYDROCHLORIDE 500 MG/1
1000 TABLET ORAL 2 TIMES DAILY
Qty: 180 TABLET | Refills: 1 | Status: SHIPPED | OUTPATIENT
Start: 2024-03-04

## 2024-03-04 RX ORDER — METFORMIN HYDROCHLORIDE 500 MG/1
1000 TABLET ORAL 2 TIMES DAILY
Qty: 360 TABLET | Refills: 0 | OUTPATIENT
Start: 2024-03-04

## 2024-03-04 RX ORDER — TAMSULOSIN HYDROCHLORIDE 0.4 MG/1
0.4 CAPSULE ORAL DAILY
Qty: 90 CAPSULE | Refills: 0 | Status: SHIPPED | OUTPATIENT
Start: 2024-03-04 | End: 2024-06-02

## 2024-03-04 RX ORDER — GLIMEPIRIDE 2 MG/1
2 TABLET ORAL DAILY
Qty: 90 TABLET | Refills: 1 | Status: SHIPPED | OUTPATIENT
Start: 2024-03-04

## 2024-03-04 NOTE — PROGRESS NOTES
Assessment/Plan   Problem List Items Addressed This Visit       BPH with obstruction/lower urinary tract symptoms    Relevant Medications    tamsulosin (Flomax) 0.4 mg 24 hr capsule    Diabetes mellitus (CMS/HCC)    Relevant Medications    glimepiride (Amaryl) 2 mg tablet    atorvastatin (Lipitor) 10 mg tablet    metFORMIN (Glucophage) 500 mg tablet    Hyperlipidemia    Relevant Medications    atorvastatin (Lipitor) 10 mg tablet    Hypertension    Relevant Medications    amLODIPine (Norvasc) 5 mg tablet     Other Visit Diagnoses       Acute paronychia of toe of left foot    -  Primary    Relevant Medications    amoxicillin-pot clavulanate (Augmentin) 500-125 mg tablet    Other Relevant Orders    Referral to Podiatry    Onychodystrophy        Relevant Orders    Referral to Podiatry        For toenail infection antibiotic is being given but the toenail is also showing significant onychomycosis as well as toenail dystrophy and needs podiatrist care consult podiatry order has been given as well  Other medical conditions as recorded above no change in the medications refill is being provided  Follow-up as previously arranged    Subjective   Patient ID: Kameron Candelaria is a 74 y.o. male who presents for Ingrown Toenail (States that he tried to cut his toenails and now has an ingrown toenail.  States that his toe is red.  Did go to Urgent Care and received antibiotics. ).    Past Surgical History:   Procedure Laterality Date    OTHER SURGICAL HISTORY  02/06/2022    Colonoscopy    OTHER SURGICAL HISTORY  01/25/2022    Hand surgery      Family History   Problem Relation Name Age of Onset    No Known Problems Mother      No Known Problems Father        Social History     Socioeconomic History    Marital status:      Spouse name: Not on file    Number of children: Not on file    Years of education: Not on file    Highest education level: Not on file   Occupational History    Not on file   Tobacco Use    Smoking status:  Never    Smokeless tobacco: Never   Substance and Sexual Activity    Alcohol use: Yes     Alcohol/week: 2.0 - 3.0 standard drinks of alcohol     Types: 2 - 3 Cans of beer per week    Drug use: Never    Sexual activity: Not on file   Other Topics Concern    Not on file   Social History Narrative    Not on file     Social Determinants of Health     Financial Resource Strain: Not on file   Food Insecurity: Not on file   Transportation Needs: Not on file   Physical Activity: Not on file   Stress: Not on file   Social Connections: Not on file   Intimate Partner Violence: Not on file   Housing Stability: Not on file      Patient has no known allergies.   Current Outpatient Medications   Medication Sig Dispense Refill    acetaminophen (TylenoL) 325 mg tablet Take 1 tablet (325 mg) by mouth every 8 hours if needed for moderate pain (4 - 6).      doxycycline (Vibra-Tabs) 100 mg tablet Take 1 tablet (100 mg) by mouth 2 times a day.      lancets 33 gauge misc 1 Lancet once daily. May provide what insurance will cover. 100 each 1    OneTouch Ultra Test strip 1 strip by Does not apply route once daily. 100 each 1    amLODIPine (Norvasc) 5 mg tablet Take 1 tablet (5 mg) by mouth once daily. 90 tablet 1    amoxicillin-pot clavulanate (Augmentin) 500-125 mg tablet Take 1 tablet (500 mg) by mouth 3 times a day for 10 days. 30 tablet 0    atorvastatin (Lipitor) 10 mg tablet Take 1 tablet (10 mg) by mouth once daily. 90 tablet 1    gabapentin (Neurontin) 300 mg capsule Take 1 capsule (300 mg) by mouth 3 times a day. 90 capsule 0    glimepiride (Amaryl) 2 mg tablet Take 1 tablet (2 mg) by mouth once daily. 90 tablet 1    metFORMIN (Glucophage) 500 mg tablet Take 2 tablets (1,000 mg) by mouth 2 times a day. 180 tablet 1    tamsulosin (Flomax) 0.4 mg 24 hr capsule Take 1 capsule (0.4 mg) by mouth once daily. 90 capsule 0     No current facility-administered medications for this visit.      Vitals:    03/04/24 1227   BP: 158/83   BP  "Location: Left arm   Patient Position: Sitting   Pulse: 88   Temp: 36.6 °C (97.8 °F)   Weight: 114 kg (252 lb 6.4 oz)   Height: 1.715 m (5' 7.5\")      Problem List Items Addressed This Visit       BPH with obstruction/lower urinary tract symptoms    Relevant Medications    tamsulosin (Flomax) 0.4 mg 24 hr capsule    Diabetes mellitus (CMS/HCC)    Relevant Medications    glimepiride (Amaryl) 2 mg tablet    atorvastatin (Lipitor) 10 mg tablet    metFORMIN (Glucophage) 500 mg tablet    Hyperlipidemia    Relevant Medications    atorvastatin (Lipitor) 10 mg tablet    Hypertension    Relevant Medications    amLODIPine (Norvasc) 5 mg tablet     Other Visit Diagnoses       Acute paronychia of toe of left foot    -  Primary    Relevant Medications    amoxicillin-pot clavulanate (Augmentin) 500-125 mg tablet    Other Relevant Orders    Referral to Podiatry    Onychodystrophy        Relevant Orders    Referral to Podiatry           Orders Placed This Encounter   Procedures    Referral to Podiatry     Standing Status:   Future     Standing Expiration Date:   9/4/2024     Referral Priority:   Routine     Referral Type:   Consultation     Referral Reason:   Specialty Services Required     Requested Specialty:   Podiatry     Number of Visits Requested:   1        HPI  Came because he was having pain and discomfort and inflammation on the side of the toenail with ingrowing features and he is concerned he used to deal with it himself but he is concerned that it is getting worse he has no fever he has no nausea or vomiting    ROS  [place] has been negative  Past medical history reviewed  Social and family history reviewed  Allergies and medications reviewed  Recent labs reviewed  Vital signs reviewed    PHYSICAL EXAM  Examination of the toe shows evidence of onychodystrophy onychomycosis and especially affecting the left great toenail there is evidence of acute paronychia with ingrowing toenails  Rest of the examination " "unchanged      No results found for: \"PR1\", \"BMPR1A\", \"CMPLAS\", \"TD7PIZKY\", \"KPSAT\"   Lab Results   Component Value Date    CHOL 140 11/28/2023    LDLCALC 68 11/28/2023    CHHDL 3.1 11/28/2023                "

## 2024-03-05 ENCOUNTER — TELEPHONE (OUTPATIENT)
Dept: PRIMARY CARE | Facility: CLINIC | Age: 74
End: 2024-03-05
Payer: COMMERCIAL

## 2024-03-05 NOTE — TELEPHONE ENCOUNTER
Patient says that his pharmacy didn't give him all of the medications that were sent in yesterday. They only gave him the Amoxicillin. [facility] is one of the pharmacy's that have been having trouble with [employer]. Can you look into this please?

## 2024-03-05 NOTE — TELEPHONE ENCOUNTER
Spoke with Anant the pharmacist with [facility].  Verified that medications were received at the pharmacy but was too early to be filled.  Patient advised and verbalized understanding.

## 2024-03-28 ENCOUNTER — OFFICE VISIT (OUTPATIENT)
Dept: UROLOGY | Facility: CLINIC | Age: 74
End: 2024-03-28
Payer: COMMERCIAL

## 2024-03-28 VITALS
TEMPERATURE: 97.7 F | BODY MASS INDEX: 38.11 KG/M2 | SYSTOLIC BLOOD PRESSURE: 127 MMHG | WEIGHT: 247 LBS | DIASTOLIC BLOOD PRESSURE: 75 MMHG | HEART RATE: 80 BPM

## 2024-03-28 DIAGNOSIS — R97.20 ELEVATED PSA: Primary | ICD-10-CM

## 2024-03-28 PROCEDURE — 3078F DIAST BP <80 MM HG: CPT | Performed by: UROLOGY

## 2024-03-28 PROCEDURE — 1159F MED LIST DOCD IN RCRD: CPT | Performed by: UROLOGY

## 2024-03-28 PROCEDURE — 3008F BODY MASS INDEX DOCD: CPT | Performed by: UROLOGY

## 2024-03-28 PROCEDURE — 99213 OFFICE O/P EST LOW 20 MIN: CPT | Performed by: UROLOGY

## 2024-03-28 PROCEDURE — 3074F SYST BP LT 130 MM HG: CPT | Performed by: UROLOGY

## 2024-03-28 NOTE — PROGRESS NOTES
Subjective   Patient ID: Kameron Candelaria is a 74 y.o. male who presents for Follow-up and MRI Results. Last seen 1/18/24 when We discussed the role of PSA in screening for prostate cancer. I recommend that we schedule an MRI of the prostate to see if there are any lesions visible. If there is a lesion detected, it will facilitate a targeted biopsy. If there is nothing found on the MRI, that does not rule out a cancer. We can still investigate further with a general IO biopsy. I recommended that he take the tamsulosin daily to reduce his urinary symptoms. Schedule MRI and follow-up with results.     HPI  Patient states his urinary flow is good and denies straining. Patient takes tamsulosin once in a while.     MRI Prostate Results  PROSTATE VOLUME:  The prostate measures  8.2 cm x  7.4 cm  x  10.9 cm in right-to-left,  anterior-posterior and craniocaudal dimension.  Prostate weight is estimated at 346.31g. PSA density is 0.02 ng/mL/g.  IMPRESSION:  1.  BPH changes of the transition zone. Diffuse non nodular  hypointensities within the peripheral zone, without evidence of  focally restricted diffusion ( PI-RADS 2).    Review of Systems  A 12 system review was completed and is negative with the exception of those signs and symptoms noted in the history of present illness.    Objective   Physical Exam  General: in NAD, appears stated age  Head: normocephalic, atraumatic  Respiratory: normal effort, no use of accessory muscles  Cardiovascular: no edema noted  Skin: normal turgor, no rashes  Neurologic: grossly intact, oriented to person/place/time  Psychiatric: mode and affect appropriate     Assessment/Plan   Problem List Items Addressed This Visit             ICD-10-CM    Elevated PSA - Primary R97.20    Relevant Orders    Prostate Specific Antigen    Follow Up In Urology    Follow Up In Urology     We reviewed MRI findings together. Prostate weight is estimated at 346.31 g PI-RADS 2.  PI-RADS 2 does not completely rule  out a low-grade cancer. I do not think a biopsy is necessary. Suggested a HoLEP procedure is something to consider. Order PSA for 1 year and we will see the patient back in follow-up with results.     Scribe Attestation  By signing my name below, I, Natali Ayers , Cherie   attest that this documentation has been prepared under the direction and in the presence of Wilfredo Reyes MD.

## 2024-06-10 DIAGNOSIS — I10 PRIMARY HYPERTENSION: ICD-10-CM

## 2024-06-10 DIAGNOSIS — E11.9 TYPE 2 DIABETES MELLITUS WITHOUT COMPLICATION, WITHOUT LONG-TERM CURRENT USE OF INSULIN (MULTI): ICD-10-CM

## 2024-06-10 DIAGNOSIS — E78.5 HYPERLIPIDEMIA, UNSPECIFIED HYPERLIPIDEMIA TYPE: ICD-10-CM

## 2024-06-10 NOTE — TELEPHONE ENCOUNTER
Rx Refill Request Telephone Encounter    Name:  Kameron Candelaria  :  [id number]  Medication Name:      glimepiride (Amaryl) 2 mg tablet     atorvastatin (Lipitor) 10 mg tablet     amLODIPine (Norvasc) 5 mg tablet    metFORMIN (Glucophage) 500 mg tablet     doxycycline (Vibra-Tabs) 100 mg tablet       Specific Pharmacy location:    [facility]  [address] [place] [address]  Phone: [phone]  Fax: [phone]     Date of last appointment:  3/4/24  Date of next appointment:  24  Best number to reach patient:   [phone]

## 2024-06-12 DIAGNOSIS — N40.1 BPH WITH OBSTRUCTION/LOWER URINARY TRACT SYMPTOMS: ICD-10-CM

## 2024-06-12 DIAGNOSIS — N13.8 BPH WITH OBSTRUCTION/LOWER URINARY TRACT SYMPTOMS: ICD-10-CM

## 2024-06-13 RX ORDER — TAMSULOSIN HYDROCHLORIDE 0.4 MG/1
0.4 CAPSULE ORAL DAILY
Qty: 90 CAPSULE | Refills: 0 | Status: SHIPPED | OUTPATIENT
Start: 2024-06-13 | End: 2024-09-11

## 2024-06-17 RX ORDER — ATORVASTATIN CALCIUM 10 MG/1
10 TABLET, FILM COATED ORAL DAILY
Qty: 90 TABLET | Refills: 1 | OUTPATIENT
Start: 2024-06-17

## 2024-06-17 RX ORDER — GLIMEPIRIDE 2 MG/1
2 TABLET ORAL DAILY
Qty: 90 TABLET | Refills: 1 | OUTPATIENT
Start: 2024-06-17

## 2024-06-17 RX ORDER — AMLODIPINE BESYLATE 5 MG/1
5 TABLET ORAL DAILY
Qty: 90 TABLET | Refills: 1 | OUTPATIENT
Start: 2024-06-17

## 2024-06-17 RX ORDER — DOXYCYCLINE HYCLATE 100 MG
100 TABLET ORAL 2 TIMES DAILY
OUTPATIENT
Start: 2024-06-17

## 2024-06-17 RX ORDER — METFORMIN HYDROCHLORIDE 500 MG/1
1000 TABLET ORAL 2 TIMES DAILY
Qty: 180 TABLET | Refills: 1 | OUTPATIENT
Start: 2024-06-17

## 2024-06-21 ENCOUNTER — APPOINTMENT (OUTPATIENT)
Dept: PRIMARY CARE | Facility: CLINIC | Age: 74
End: 2024-06-21
Payer: COMMERCIAL

## 2024-06-25 ENCOUNTER — APPOINTMENT (OUTPATIENT)
Dept: PRIMARY CARE | Facility: CLINIC | Age: 74
End: 2024-06-25
Payer: COMMERCIAL

## 2024-06-28 ENCOUNTER — APPOINTMENT (OUTPATIENT)
Dept: PRIMARY CARE | Facility: CLINIC | Age: 74
End: 2024-06-28
Payer: COMMERCIAL

## 2024-07-08 ENCOUNTER — APPOINTMENT (OUTPATIENT)
Dept: PRIMARY CARE | Facility: CLINIC | Age: 74
End: 2024-07-08
Payer: COMMERCIAL

## 2024-07-08 VITALS
WEIGHT: 247.8 LBS | TEMPERATURE: 97.3 F | HEART RATE: 65 BPM | BODY MASS INDEX: 38.89 KG/M2 | DIASTOLIC BLOOD PRESSURE: 75 MMHG | SYSTOLIC BLOOD PRESSURE: 150 MMHG | HEIGHT: 67 IN

## 2024-07-08 DIAGNOSIS — E78.5 HYPERLIPIDEMIA, UNSPECIFIED HYPERLIPIDEMIA TYPE: ICD-10-CM

## 2024-07-08 DIAGNOSIS — Z00.00 ROUTINE GENERAL MEDICAL EXAMINATION AT HEALTH CARE FACILITY: Primary | ICD-10-CM

## 2024-07-08 DIAGNOSIS — G62.89 OTHER POLYNEUROPATHY: ICD-10-CM

## 2024-07-08 DIAGNOSIS — I10 PRIMARY HYPERTENSION: ICD-10-CM

## 2024-07-08 DIAGNOSIS — E11.9 TYPE 2 DIABETES MELLITUS WITHOUT COMPLICATION, WITHOUT LONG-TERM CURRENT USE OF INSULIN (MULTI): ICD-10-CM

## 2024-07-08 PROCEDURE — 1159F MED LIST DOCD IN RCRD: CPT | Performed by: INTERNAL MEDICINE

## 2024-07-08 PROCEDURE — 1160F RVW MEDS BY RX/DR IN RCRD: CPT | Performed by: INTERNAL MEDICINE

## 2024-07-08 PROCEDURE — 3077F SYST BP >= 140 MM HG: CPT | Performed by: INTERNAL MEDICINE

## 2024-07-08 PROCEDURE — 1036F TOBACCO NON-USER: CPT | Performed by: INTERNAL MEDICINE

## 2024-07-08 PROCEDURE — 3008F BODY MASS INDEX DOCD: CPT | Performed by: INTERNAL MEDICINE

## 2024-07-08 PROCEDURE — 1170F FXNL STATUS ASSESSED: CPT | Performed by: INTERNAL MEDICINE

## 2024-07-08 PROCEDURE — G0439 PPPS, SUBSEQ VISIT: HCPCS | Performed by: INTERNAL MEDICINE

## 2024-07-08 PROCEDURE — 3078F DIAST BP <80 MM HG: CPT | Performed by: INTERNAL MEDICINE

## 2024-07-08 ASSESSMENT — ACTIVITIES OF DAILY LIVING (ADL)
BATHING: INDEPENDENT
GROCERY_SHOPPING: INDEPENDENT
DOING_HOUSEWORK: INDEPENDENT
DRESSING: INDEPENDENT
MANAGING_FINANCES: INDEPENDENT
TAKING_MEDICATION: INDEPENDENT

## 2024-07-08 ASSESSMENT — PATIENT HEALTH QUESTIONNAIRE - PHQ9
SUM OF ALL RESPONSES TO PHQ9 QUESTIONS 1 AND 2: 0
1. LITTLE INTEREST OR PLEASURE IN DOING THINGS: NOT AT ALL
2. FEELING DOWN, DEPRESSED OR HOPELESS: NOT AT ALL

## 2024-07-08 NOTE — PROGRESS NOTES
"Subjective   Reason for Visit: Kameron Candelaria is an 74 y.o. male here for a Medicare Wellness visit.     Past Medical, Surgical, and Family History reviewed and updated in chart.    Reviewed all medications by prescribing practitioner or clinical pharmacist (such as prescriptions, OTCs, herbal therapies and supplements) and documented in the medical record.    HPI  Review convalesce  Has a living well  He has a stopped taking gabapentin because he does not need it for his peripheral neuropathic symptoms  Seems to have been taking medication but the blood pressure not under control he did not want any changes right now  He is supposed to lose weight diet and exercise he understands that  He was supposed to have a blood work done did not get it done    Patient Care Team:  Sebastian Connelly MD as PCP - General  Sebastian Connelly MD as PCP - [facility] [employer] PCP     Review of Systems  All systemic inquiry is negative  Past medical history reviewed  Social and family history reviewed  Allergies and medications reviewed  Recent labs reviewed  Vital signs reviewed    Objective   Vitals:  /75 (BP Location: Left arm, Patient Position: Sitting)   Pulse 65   Temp 36.3 °C (97.3 °F)   Ht 1.702 m (5' 7\")   Wt 112 kg (247 lb 12.8 oz)   BMI 38.81 kg/m²       Physical Exam  JVD negative  No edema  Heart sounds regular  Chest clear  Abdomen soft nontender  Neuro awake and alert    Assessment/Plan   Problem List Items Addressed This Visit       Diabetes mellitus (Multi)    Hyperlipidemia    Hypertension    Peripheral neuropathy    BMI 38.0-38.9,adult     Other Visit Diagnoses       Routine general medical examination at health care facility    -  Primary        Discussed comprehensive care current medications immunization living well  Blood work which was ordered has not been done advised to have it done and follow-up in next 3 to 4 weeks  I said compliance with medication for the control of blood pressure is " important and if it is perceived that additional medications or dose adjustment will be needed we will do so when he comes back in 3 to 4 weeks

## 2024-07-09 ENCOUNTER — LAB (OUTPATIENT)
Dept: LAB | Facility: LAB | Age: 74
End: 2024-07-09
Payer: COMMERCIAL

## 2024-07-09 ENCOUNTER — TELEPHONE (OUTPATIENT)
Dept: PRIMARY CARE | Facility: CLINIC | Age: 74
End: 2024-07-09

## 2024-07-09 DIAGNOSIS — E78.5 HYPERLIPIDEMIA, UNSPECIFIED HYPERLIPIDEMIA TYPE: ICD-10-CM

## 2024-07-09 DIAGNOSIS — E11.9 TYPE 2 DIABETES MELLITUS WITHOUT COMPLICATION, WITHOUT LONG-TERM CURRENT USE OF INSULIN (MULTI): ICD-10-CM

## 2024-07-09 DIAGNOSIS — E66.01 OBESITY, MORBID (MULTI): ICD-10-CM

## 2024-07-09 DIAGNOSIS — Z00.00 WELL ADULT HEALTH CHECK: ICD-10-CM

## 2024-07-09 LAB
ALBUMIN SERPL BCP-MCNC: 4 G/DL (ref 3.4–5)
ALP SERPL-CCNC: 66 U/L (ref 33–136)
ALT SERPL W P-5'-P-CCNC: 17 U/L (ref 10–52)
ANION GAP SERPL CALC-SCNC: 14 MMOL/L (ref 10–20)
AST SERPL W P-5'-P-CCNC: 15 U/L (ref 9–39)
BILIRUB SERPL-MCNC: 0.7 MG/DL (ref 0–1.2)
BUN SERPL-MCNC: 15 MG/DL (ref 6–23)
CALCIUM SERPL-MCNC: 9 MG/DL (ref 8.6–10.3)
CHLORIDE SERPL-SCNC: 104 MMOL/L (ref 98–107)
CHOLEST SERPL-MCNC: 132 MG/DL (ref 0–199)
CHOLESTEROL/HDL RATIO: 2.8
CO2 SERPL-SCNC: 26 MMOL/L (ref 21–32)
CREAT SERPL-MCNC: 1.11 MG/DL (ref 0.5–1.3)
EGFRCR SERPLBLD CKD-EPI 2021: 70 ML/MIN/1.73M*2
EST. AVERAGE GLUCOSE BLD GHB EST-MCNC: 154 MG/DL
GLUCOSE SERPL-MCNC: 109 MG/DL (ref 74–99)
HBA1C MFR BLD: 7 %
HDLC SERPL-MCNC: 47.9 MG/DL
LDLC SERPL CALC-MCNC: 56 MG/DL
NON HDL CHOLESTEROL: 84 MG/DL (ref 0–149)
POTASSIUM SERPL-SCNC: 4.5 MMOL/L (ref 3.5–5.3)
PROT SERPL-MCNC: 6.7 G/DL (ref 6.4–8.2)
SODIUM SERPL-SCNC: 139 MMOL/L (ref 136–145)
TRIGL SERPL-MCNC: 141 MG/DL (ref 0–149)
TSH SERPL-ACNC: 3.83 MIU/L (ref 0.44–3.98)
VLDL: 28 MG/DL (ref 0–40)

## 2024-07-09 PROCEDURE — 84443 ASSAY THYROID STIM HORMONE: CPT

## 2024-07-09 PROCEDURE — 80053 COMPREHEN METABOLIC PANEL: CPT

## 2024-07-09 PROCEDURE — 83036 HEMOGLOBIN GLYCOSYLATED A1C: CPT

## 2024-07-09 PROCEDURE — 80061 LIPID PANEL: CPT

## 2024-07-09 PROCEDURE — 36415 COLL VENOUS BLD VENIPUNCTURE: CPT

## 2024-07-09 NOTE — TELEPHONE ENCOUNTER
----- Message from Sebastian Connelly MD sent at 7/9/2024  4:26 PM EDT -----  Will review the lab results at the next visit

## 2024-07-09 NOTE — TELEPHONE ENCOUNTER
Left voicemail for patient to call office back to advise labs were normal and will discuss in detail at POV.

## 2024-08-08 ENCOUNTER — APPOINTMENT (OUTPATIENT)
Dept: PRIMARY CARE | Facility: CLINIC | Age: 74
End: 2024-08-08
Payer: COMMERCIAL

## 2024-08-09 ENCOUNTER — TELEPHONE (OUTPATIENT)
Dept: PRIMARY CARE | Facility: CLINIC | Age: 74
End: 2024-08-09
Payer: COMMERCIAL

## 2024-09-03 ENCOUNTER — APPOINTMENT (OUTPATIENT)
Dept: RADIOLOGY | Facility: HOSPITAL | Age: 74
End: 2024-09-03
Payer: COMMERCIAL

## 2024-09-03 ENCOUNTER — APPOINTMENT (OUTPATIENT)
Dept: CARDIOLOGY | Facility: HOSPITAL | Age: 74
End: 2024-09-03
Payer: COMMERCIAL

## 2024-09-03 ENCOUNTER — HOSPITAL ENCOUNTER (OUTPATIENT)
Facility: HOSPITAL | Age: 74
Setting detail: OBSERVATION
Discharge: HOME | End: 2024-09-04
Attending: EMERGENCY MEDICINE | Admitting: INTERNAL MEDICINE
Payer: COMMERCIAL

## 2024-09-03 DIAGNOSIS — I10 PRIMARY HYPERTENSION: ICD-10-CM

## 2024-09-03 DIAGNOSIS — N32.0 BLADDER OUTLET OBSTRUCTION: Primary | ICD-10-CM

## 2024-09-03 PROBLEM — R31.9 HEMATURIA: Status: ACTIVE | Noted: 2024-09-03

## 2024-09-03 LAB
ALBUMIN SERPL BCP-MCNC: 4.3 G/DL (ref 3.4–5)
ALP SERPL-CCNC: 74 U/L (ref 33–136)
ALT SERPL W P-5'-P-CCNC: 23 U/L (ref 10–52)
ANION GAP SERPL CALC-SCNC: 12 MMOL/L (ref 10–20)
APPEARANCE UR: ABNORMAL
AST SERPL W P-5'-P-CCNC: 16 U/L (ref 9–39)
ATRIAL RATE: 83 BPM
BASOPHILS # BLD AUTO: 0.12 X10*3/UL (ref 0–0.1)
BASOPHILS NFR BLD AUTO: 1.1 %
BILIRUB SERPL-MCNC: 1 MG/DL (ref 0–1.2)
BILIRUB UR STRIP.AUTO-MCNC: NEGATIVE MG/DL
BUN SERPL-MCNC: 21 MG/DL (ref 6–23)
CALCIUM SERPL-MCNC: 9.5 MG/DL (ref 8.6–10.3)
CHLORIDE SERPL-SCNC: 102 MMOL/L (ref 98–107)
CO2 SERPL-SCNC: 26 MMOL/L (ref 21–32)
COLOR UR: ABNORMAL
CREAT SERPL-MCNC: 1.43 MG/DL (ref 0.5–1.3)
EGFRCR SERPLBLD CKD-EPI 2021: 51 ML/MIN/1.73M*2
EOSINOPHIL # BLD AUTO: 0.35 X10*3/UL (ref 0–0.4)
EOSINOPHIL NFR BLD AUTO: 3.1 %
ERYTHROCYTE [DISTWIDTH] IN BLOOD BY AUTOMATED COUNT: 12.5 % (ref 11.5–14.5)
GLUCOSE BLD MANUAL STRIP-MCNC: 154 MG/DL (ref 74–99)
GLUCOSE BLD MANUAL STRIP-MCNC: 173 MG/DL (ref 74–99)
GLUCOSE SERPL-MCNC: 178 MG/DL (ref 74–99)
GLUCOSE UR STRIP.AUTO-MCNC: ABNORMAL MG/DL
HCT VFR BLD AUTO: 41.7 % (ref 41–52)
HGB BLD-MCNC: 13.9 G/DL (ref 13.5–17.5)
IMM GRANULOCYTES # BLD AUTO: 0.03 X10*3/UL (ref 0–0.5)
IMM GRANULOCYTES NFR BLD AUTO: 0.3 % (ref 0–0.9)
KETONES UR STRIP.AUTO-MCNC: NEGATIVE MG/DL
LEUKOCYTE ESTERASE UR QL STRIP.AUTO: NEGATIVE
LYMPHOCYTES # BLD AUTO: 1.73 X10*3/UL (ref 0.8–3)
LYMPHOCYTES NFR BLD AUTO: 15.2 %
MAGNESIUM SERPL-MCNC: 1.92 MG/DL (ref 1.6–2.4)
MCH RBC QN AUTO: 28.2 PG (ref 26–34)
MCHC RBC AUTO-ENTMCNC: 33.3 G/DL (ref 32–36)
MCV RBC AUTO: 85 FL (ref 80–100)
MONOCYTES # BLD AUTO: 0.94 X10*3/UL (ref 0.05–0.8)
MONOCYTES NFR BLD AUTO: 8.3 %
NEUTROPHILS # BLD AUTO: 8.18 X10*3/UL (ref 1.6–5.5)
NEUTROPHILS NFR BLD AUTO: 72 %
NITRITE UR QL STRIP.AUTO: NEGATIVE
NRBC BLD-RTO: 0 /100 WBCS (ref 0–0)
P AXIS: 52 DEGREES
P OFFSET: 199 MS
P ONSET: 147 MS
PH UR STRIP.AUTO: 6 [PH]
PLATELET # BLD AUTO: 204 X10*3/UL (ref 150–450)
POTASSIUM SERPL-SCNC: 4.7 MMOL/L (ref 3.5–5.3)
PR INTERVAL: 164 MS
PROT SERPL-MCNC: 7.5 G/DL (ref 6.4–8.2)
PROT UR STRIP.AUTO-MCNC: ABNORMAL MG/DL
PSA SERPL-MCNC: 9.74 NG/ML
Q ONSET: 229 MS
QRS COUNT: 14 BEATS
QRS DURATION: 64 MS
QT INTERVAL: 350 MS
QTC CALCULATION(BAZETT): 411 MS
QTC FREDERICIA: 390 MS
R AXIS: 38 DEGREES
RBC # BLD AUTO: 4.93 X10*6/UL (ref 4.5–5.9)
RBC # UR STRIP.AUTO: ABNORMAL /UL
RBC #/AREA URNS AUTO: >20 /HPF
SODIUM SERPL-SCNC: 135 MMOL/L (ref 136–145)
SP GR UR STRIP.AUTO: 1.01
T AXIS: 32 DEGREES
T OFFSET: 404 MS
UROBILINOGEN UR STRIP.AUTO-MCNC: NORMAL MG/DL
VENTRICULAR RATE: 83 BPM
WBC # BLD AUTO: 11.4 X10*3/UL (ref 4.4–11.3)
WBC #/AREA URNS AUTO: >50 /HPF
WBC CLUMPS #/AREA URNS AUTO: ABNORMAL /HPF

## 2024-09-03 PROCEDURE — 99222 1ST HOSP IP/OBS MODERATE 55: CPT | Performed by: UROLOGY

## 2024-09-03 PROCEDURE — 2550000001 HC RX 255 CONTRASTS: Performed by: EMERGENCY MEDICINE

## 2024-09-03 PROCEDURE — 99285 EMERGENCY DEPT VISIT HI MDM: CPT

## 2024-09-03 PROCEDURE — 84153 ASSAY OF PSA TOTAL: CPT | Mod: STJLAB

## 2024-09-03 PROCEDURE — 87086 URINE CULTURE/COLONY COUNT: CPT | Mod: STJLAB

## 2024-09-03 PROCEDURE — 51702 INSERT TEMP BLADDER CATH: CPT

## 2024-09-03 PROCEDURE — 81001 URINALYSIS AUTO W/SCOPE: CPT

## 2024-09-03 PROCEDURE — 74177 CT ABD & PELVIS W/CONTRAST: CPT | Mod: FOREIGN READ | Performed by: RADIOLOGY

## 2024-09-03 PROCEDURE — 83735 ASSAY OF MAGNESIUM: CPT

## 2024-09-03 PROCEDURE — 93005 ELECTROCARDIOGRAM TRACING: CPT

## 2024-09-03 PROCEDURE — 80053 COMPREHEN METABOLIC PANEL: CPT

## 2024-09-03 PROCEDURE — 99285 EMERGENCY DEPT VISIT HI MDM: CPT | Performed by: EMERGENCY MEDICINE

## 2024-09-03 PROCEDURE — 82947 ASSAY GLUCOSE BLOOD QUANT: CPT

## 2024-09-03 PROCEDURE — 99222 1ST HOSP IP/OBS MODERATE 55: CPT

## 2024-09-03 PROCEDURE — 2500000002 HC RX 250 W HCPCS SELF ADMINISTERED DRUGS (ALT 637 FOR MEDICARE OP, ALT 636 FOR OP/ED)

## 2024-09-03 PROCEDURE — 2500000005 HC RX 250 GENERAL PHARMACY W/O HCPCS

## 2024-09-03 PROCEDURE — 74177 CT ABD & PELVIS W/CONTRAST: CPT

## 2024-09-03 PROCEDURE — 36415 COLL VENOUS BLD VENIPUNCTURE: CPT

## 2024-09-03 PROCEDURE — 2500000004 HC RX 250 GENERAL PHARMACY W/ HCPCS (ALT 636 FOR OP/ED): Performed by: INTERNAL MEDICINE

## 2024-09-03 PROCEDURE — 2500000002 HC RX 250 W HCPCS SELF ADMINISTERED DRUGS (ALT 637 FOR MEDICARE OP, ALT 636 FOR OP/ED): Performed by: INTERNAL MEDICINE

## 2024-09-03 PROCEDURE — G0378 HOSPITAL OBSERVATION PER HR: HCPCS

## 2024-09-03 PROCEDURE — 2500000001 HC RX 250 WO HCPCS SELF ADMINISTERED DRUGS (ALT 637 FOR MEDICARE OP): Performed by: INTERNAL MEDICINE

## 2024-09-03 PROCEDURE — 85025 COMPLETE CBC W/AUTO DIFF WBC: CPT

## 2024-09-03 RX ORDER — ACETAMINOPHEN 650 MG/1
650 SUPPOSITORY RECTAL EVERY 6 HOURS PRN
Status: DISCONTINUED | OUTPATIENT
Start: 2024-09-03 | End: 2024-09-04 | Stop reason: HOSPADM

## 2024-09-03 RX ORDER — ONDANSETRON 4 MG/1
4 TABLET, FILM COATED ORAL EVERY 8 HOURS PRN
Status: DISCONTINUED | OUTPATIENT
Start: 2024-09-03 | End: 2024-09-04 | Stop reason: HOSPADM

## 2024-09-03 RX ORDER — PROMETHAZINE HYDROCHLORIDE 25 MG/1
25 TABLET ORAL EVERY 6 HOURS PRN
Status: DISCONTINUED | OUTPATIENT
Start: 2024-09-03 | End: 2024-09-04 | Stop reason: HOSPADM

## 2024-09-03 RX ORDER — LIDOCAINE HYDROCHLORIDE 20 MG/ML
1 JELLY TOPICAL ONCE
Status: COMPLETED | OUTPATIENT
Start: 2024-09-03 | End: 2024-09-03

## 2024-09-03 RX ORDER — INSULIN LISPRO 100 [IU]/ML
0-10 INJECTION, SOLUTION INTRAVENOUS; SUBCUTANEOUS
Status: DISCONTINUED | OUTPATIENT
Start: 2024-09-03 | End: 2024-09-04 | Stop reason: HOSPADM

## 2024-09-03 RX ORDER — ONDANSETRON HYDROCHLORIDE 2 MG/ML
4 INJECTION, SOLUTION INTRAVENOUS EVERY 8 HOURS PRN
Status: DISCONTINUED | OUTPATIENT
Start: 2024-09-03 | End: 2024-09-04 | Stop reason: HOSPADM

## 2024-09-03 RX ORDER — POLYETHYLENE GLYCOL 3350 17 G/17G
17 POWDER, FOR SOLUTION ORAL DAILY PRN
Status: DISCONTINUED | OUTPATIENT
Start: 2024-09-03 | End: 2024-09-04 | Stop reason: HOSPADM

## 2024-09-03 RX ORDER — GUAIFENESIN 600 MG/1
600 TABLET, EXTENDED RELEASE ORAL EVERY 12 HOURS PRN
Status: DISCONTINUED | OUTPATIENT
Start: 2024-09-03 | End: 2024-09-04 | Stop reason: HOSPADM

## 2024-09-03 RX ORDER — PROMETHAZINE HYDROCHLORIDE 25 MG/1
25 SUPPOSITORY RECTAL EVERY 12 HOURS PRN
Status: DISCONTINUED | OUTPATIENT
Start: 2024-09-03 | End: 2024-09-04 | Stop reason: HOSPADM

## 2024-09-03 RX ORDER — TAMSULOSIN HYDROCHLORIDE 0.4 MG/1
0.4 CAPSULE ORAL DAILY
Status: DISCONTINUED | OUTPATIENT
Start: 2024-09-03 | End: 2024-09-04 | Stop reason: HOSPADM

## 2024-09-03 RX ORDER — SODIUM CHLORIDE 9 MG/ML
100 INJECTION, SOLUTION INTRAVENOUS CONTINUOUS
Status: ACTIVE | OUTPATIENT
Start: 2024-09-03 | End: 2024-09-04

## 2024-09-03 RX ORDER — ACETAMINOPHEN 160 MG/5ML
650 SOLUTION ORAL EVERY 6 HOURS PRN
Status: DISCONTINUED | OUTPATIENT
Start: 2024-09-03 | End: 2024-09-04 | Stop reason: HOSPADM

## 2024-09-03 RX ORDER — AMLODIPINE BESYLATE 5 MG/1
5 TABLET ORAL DAILY
Status: DISCONTINUED | OUTPATIENT
Start: 2024-09-03 | End: 2024-09-04 | Stop reason: HOSPADM

## 2024-09-03 RX ORDER — ACETAMINOPHEN 325 MG/1
650 TABLET ORAL EVERY 6 HOURS PRN
Status: DISCONTINUED | OUTPATIENT
Start: 2024-09-03 | End: 2024-09-04 | Stop reason: HOSPADM

## 2024-09-03 RX ORDER — ATORVASTATIN CALCIUM 10 MG/1
10 TABLET, FILM COATED ORAL DAILY
Status: DISCONTINUED | OUTPATIENT
Start: 2024-09-04 | End: 2024-09-04 | Stop reason: HOSPADM

## 2024-09-03 RX ORDER — GLIMEPIRIDE 1 MG/1
2 TABLET ORAL DAILY
Status: DISCONTINUED | OUTPATIENT
Start: 2024-09-04 | End: 2024-09-04 | Stop reason: HOSPADM

## 2024-09-03 SDOH — SOCIAL STABILITY: SOCIAL INSECURITY: ARE THERE ANY APPARENT SIGNS OF INJURIES/BEHAVIORS THAT COULD BE RELATED TO ABUSE/NEGLECT?: NO

## 2024-09-03 SDOH — ECONOMIC STABILITY: TRANSPORTATION INSECURITY
IN THE PAST 12 MONTHS, HAS LACK OF TRANSPORTATION KEPT YOU FROM MEETINGS, WORK, OR FROM GETTING THINGS NEEDED FOR DAILY LIVING?: NO

## 2024-09-03 SDOH — SOCIAL STABILITY: SOCIAL INSECURITY: DO YOU FEEL UNSAFE GOING BACK TO THE PLACE WHERE YOU ARE LIVING?: NO

## 2024-09-03 SDOH — ECONOMIC STABILITY: INCOME INSECURITY: IN THE LAST 12 MONTHS, WAS THERE A TIME WHEN YOU WERE NOT ABLE TO PAY THE MORTGAGE OR RENT ON TIME?: NO

## 2024-09-03 SDOH — ECONOMIC STABILITY: TRANSPORTATION INSECURITY
IN THE PAST 12 MONTHS, HAS THE LACK OF TRANSPORTATION KEPT YOU FROM MEDICAL APPOINTMENTS OR FROM GETTING MEDICATIONS?: NO

## 2024-09-03 SDOH — SOCIAL STABILITY: SOCIAL INSECURITY: DOES ANYONE TRY TO KEEP YOU FROM HAVING/CONTACTING OTHER FRIENDS OR DOING THINGS OUTSIDE YOUR HOME?: NO

## 2024-09-03 SDOH — SOCIAL STABILITY: SOCIAL INSECURITY: WERE YOU ABLE TO COMPLETE ALL THE BEHAVIORAL HEALTH SCREENINGS?: YES

## 2024-09-03 SDOH — SOCIAL STABILITY: SOCIAL INSECURITY: ABUSE: ADULT

## 2024-09-03 SDOH — ECONOMIC STABILITY: HOUSING INSECURITY: IN THE PAST 12 MONTHS, HOW MANY TIMES HAVE YOU MOVED WHERE YOU WERE LIVING?: 1

## 2024-09-03 SDOH — SOCIAL STABILITY: SOCIAL INSECURITY: HAS ANYONE EVER THREATENED TO HURT YOUR FAMILY OR YOUR PETS?: NO

## 2024-09-03 SDOH — ECONOMIC STABILITY: HOUSING INSECURITY: AT ANY TIME IN THE PAST 12 MONTHS, WERE YOU HOMELESS OR LIVING IN A SHELTER (INCLUDING NOW)?: NO

## 2024-09-03 SDOH — ECONOMIC STABILITY: INCOME INSECURITY: HOW HARD IS IT FOR YOU TO PAY FOR THE VERY BASICS LIKE FOOD, HOUSING, MEDICAL CARE, AND HEATING?: PATIENT DECLINED

## 2024-09-03 SDOH — SOCIAL STABILITY: SOCIAL INSECURITY: ARE YOU OR HAVE YOU BEEN THREATENED OR ABUSED PHYSICALLY, EMOTIONALLY, OR SEXUALLY BY ANYONE?: NO

## 2024-09-03 SDOH — SOCIAL STABILITY: SOCIAL INSECURITY: DO YOU FEEL ANYONE HAS EXPLOITED OR TAKEN ADVANTAGE OF YOU FINANCIALLY OR OF YOUR PERSONAL PROPERTY?: NO

## 2024-09-03 SDOH — SOCIAL STABILITY: SOCIAL INSECURITY: HAVE YOU HAD THOUGHTS OF HARMING ANYONE ELSE?: NO

## 2024-09-03 ASSESSMENT — ACTIVITIES OF DAILY LIVING (ADL)
GROOMING: INDEPENDENT
ADEQUATE_TO_COMPLETE_ADL: NO
PATIENT'S MEMORY ADEQUATE TO SAFELY COMPLETE DAILY ACTIVITIES?: NO
FEEDING YOURSELF: INDEPENDENT
WALKS IN HOME: INDEPENDENT
BATHING: INDEPENDENT
JUDGMENT_ADEQUATE_SAFELY_COMPLETE_DAILY_ACTIVITIES: NO
HEARING - LEFT EAR: FUNCTIONAL
DRESSING YOURSELF: INDEPENDENT
HEARING - RIGHT EAR: FUNCTIONAL
TOILETING: INDEPENDENT

## 2024-09-03 ASSESSMENT — PAIN SCALES - GENERAL
PAINLEVEL_OUTOF10: 10 - WORST POSSIBLE PAIN
PAINLEVEL_OUTOF10: 10 - WORST POSSIBLE PAIN
PAINLEVEL_OUTOF10: 0 - NO PAIN
PAINLEVEL_OUTOF10: 0 - NO PAIN

## 2024-09-03 ASSESSMENT — COGNITIVE AND FUNCTIONAL STATUS - GENERAL
DAILY ACTIVITIY SCORE: 24
PATIENT BASELINE BEDBOUND: NO
MOBILITY SCORE: 24

## 2024-09-03 ASSESSMENT — PAIN DESCRIPTION - DESCRIPTORS: DESCRIPTORS: ACHING

## 2024-09-03 ASSESSMENT — COLUMBIA-SUICIDE SEVERITY RATING SCALE - C-SSRS
1. IN THE PAST MONTH, HAVE YOU WISHED YOU WERE DEAD OR WISHED YOU COULD GO TO SLEEP AND NOT WAKE UP?: NO
6. HAVE YOU EVER DONE ANYTHING, STARTED TO DO ANYTHING, OR PREPARED TO DO ANYTHING TO END YOUR LIFE?: NO
2. HAVE YOU ACTUALLY HAD ANY THOUGHTS OF KILLING YOURSELF?: NO

## 2024-09-03 ASSESSMENT — LIFESTYLE VARIABLES
HOW OFTEN DO YOU HAVE 6 OR MORE DRINKS ON ONE OCCASION: NEVER
EVER HAD A DRINK FIRST THING IN THE MORNING TO STEADY YOUR NERVES TO GET RID OF A HANGOVER: NO
TOTAL SCORE: 0
AUDIT-C TOTAL SCORE: 0
SKIP TO QUESTIONS 9-10: 1
HOW MANY STANDARD DRINKS CONTAINING ALCOHOL DO YOU HAVE ON A TYPICAL DAY: PATIENT DOES NOT DRINK
HAVE YOU EVER FELT YOU SHOULD CUT DOWN ON YOUR DRINKING: NO
AUDIT-C TOTAL SCORE: 0
HAVE PEOPLE ANNOYED YOU BY CRITICIZING YOUR DRINKING: NO
EVER FELT BAD OR GUILTY ABOUT YOUR DRINKING: NO
HOW OFTEN DO YOU HAVE A DRINK CONTAINING ALCOHOL: NEVER

## 2024-09-03 ASSESSMENT — PATIENT HEALTH QUESTIONNAIRE - PHQ9
2. FEELING DOWN, DEPRESSED OR HOPELESS: NOT AT ALL
1. LITTLE INTEREST OR PLEASURE IN DOING THINGS: NOT AT ALL
SUM OF ALL RESPONSES TO PHQ9 QUESTIONS 1 & 2: 0

## 2024-09-03 ASSESSMENT — PAIN DESCRIPTION - ORIENTATION: ORIENTATION: RIGHT;LEFT

## 2024-09-03 ASSESSMENT — PAIN DESCRIPTION - PAIN TYPE: TYPE: ACUTE PAIN

## 2024-09-03 ASSESSMENT — PAIN - FUNCTIONAL ASSESSMENT
PAIN_FUNCTIONAL_ASSESSMENT: 0-10

## 2024-09-03 ASSESSMENT — PAIN DESCRIPTION - LOCATION: LOCATION: BACK

## 2024-09-03 NOTE — CARE PLAN
Problem: Chronic Conditions and Co-morbidities  Goal: Patient's chronic conditions and co-morbidity symptoms are monitored and maintained or improved  Outcome: Progressing  Flowsheets (Taken 9/3/2024 1731)  Care Plan - Patient's Chronic Conditions and Co-Morbidity Symptoms are Monitored and Maintained or Improved:   Collaborate with multidisciplinary team to address chronic and comorbid conditions and prevent exacerbation or deterioration   Update acute care plan with appropriate goals if chronic or comorbid symptoms are exacerbated and prevent overall improvement and discharge     Problem: Safety - Adult  Goal: Free from fall injury  Outcome: Progressing  Flowsheets (Taken 9/3/2024 1731)  Free from fall injury: Instruct family/caregiver on patient safety   The patient's goals for the shift include      The clinical goals for the shift include Reduce pain

## 2024-09-03 NOTE — ED PROVIDER NOTES
EMERGENCY DEPARTMENT ENCOUNTER      Pt Name: Kameron Candelaria  MRN: [id number]  Birthdate 1950  Date of evaluation: 9/3/2024  Provider: Pradeep Zamudio MD    CHIEF COMPLAINT       Chief Complaint   Patient presents with    Difficulty Urinating     Pt states he has not peed since yesterday,     UTI     Burning when trying to pee,     Flank Pain     HISTORY OF PRESENT ILLNESS    HPI  74-year-old male presents emergency department with chief complaint of difficulty urinating.  Patient claims that he has not urinated since last night.  He indicates that he has had a previous episode of this before due to his prostate being enlarged.  He indicates that he has abdominal fullness and tenderness as well as pain radiating to the back.  Nursing Notes were reviewed.    PAST MEDICAL HISTORY     Past Medical History:   Diagnosis Date    Benign prostatic hyperplasia with lower urinary tract symptoms     Enlarged prostate with lower urinary tract symptoms (LUTS)    Overweight     Overweight    Personal history of other diseases of urinary system     History of hematuria    Personal history of other endocrine, nutritional and metabolic disease     History of morbid obesity    Personal history of other specified conditions     History of fatigue    Presence of other specified devices 02/07/2022    Indwelling Hull catheter present    Tinea unguium     Onychomycosis of toenail         SURGICAL HISTORY       Past Surgical History:   Procedure Laterality Date    OTHER SURGICAL HISTORY  02/06/2022    Colonoscopy    OTHER SURGICAL HISTORY  01/25/2022    Hand surgery         CURRENT MEDICATIONS       Previous Medications    ACETAMINOPHEN (TYLENOL) 325 MG TABLET    Take 1 tablet (325 mg) by mouth every 8 hours if needed for moderate pain (4 - 6).    AMLODIPINE (NORVASC) 5 MG TABLET    Take 1 tablet (5 mg) by mouth once daily.    ATORVASTATIN (LIPITOR) 10 MG TABLET    Take 1 tablet (10 mg) by mouth once daily.    GLIMEPIRIDE (AMARYL) 2 MG  TABLET    Take 1 tablet (2 mg) by mouth once daily.    LANCETS 33 GAUGE MISC    1 Lancet once daily. May provide what insurance will cover.    METFORMIN (GLUCOPHAGE) 500 MG TABLET    Take 2 tablets (1,000 mg) by mouth 2 times a day.    ONETOUCH ULTRA TEST STRIP    1 strip by Does not apply route once daily.    TAMSULOSIN (FLOMAX) 0.4 MG 24 HR CAPSULE    TAKE 1 CAPSULE (0.4 MG) BY MOUTH ONCE DAILY       ALLERGIES     Patient has no known allergies.    FAMILY HISTORY       Family History   Problem Relation Name Age of Onset    No Known Problems Mother      No Known Problems Father            SOCIAL HISTORY       Social History     Socioeconomic History    Marital status:    Tobacco Use    Smoking status: Never    Smokeless tobacco: Never   Substance and Sexual Activity    Alcohol use: Yes     Alcohol/week: 2.0 - 3.0 standard drinks of alcohol     Types: 2 - 3 Cans of beer per week    Drug use: Never       SCREENINGS                        PHYSICAL EXAM    (up to 7 for level 4, 8 or more for level 5)     ED Triage Vitals [09/03/24 1140]   Temperature Heart Rate Respirations BP   36.5 °C (97.7 °F) 87 18 169/87      Pulse Ox Temp Source Heart Rate Source Patient Position   95 % Temporal Monitor Sitting      BP Location FiO2 (%)     Right arm --       Physical Exam  Vitals and nursing note reviewed.   Constitutional:       General: He is not in acute distress.     Appearance: He is well-developed.   HENT:      Head: Normocephalic and atraumatic.   Eyes:      Conjunctiva/sclera: Conjunctivae normal.   Cardiovascular:      Rate and Rhythm: Normal rate and regular rhythm.      Heart sounds: No murmur heard.  Pulmonary:      Effort: Pulmonary effort is normal. No respiratory distress.      Breath sounds: Normal breath sounds.   Abdominal:      General: Bowel sounds are normal. There is distension.      Palpations: Abdomen is rigid.      Tenderness: There is abdominal tenderness in the periumbilical area and suprapubic  area.      Comments: Patient's abdomen is diffusely distended.  He denies any flank pain on palpitation.   Musculoskeletal:         General: No swelling.      Cervical back: Neck supple.   Skin:     General: Skin is warm and dry.      Capillary Refill: Capillary refill takes less than 2 seconds.   Neurological:      Mental Status: He is alert.   Psychiatric:         Mood and Affect: Mood normal.          DIAGNOSTIC RESULTS     LABS:  Labs Reviewed   URINALYSIS WITH REFLEX CULTURE AND MICROSCOPIC    Narrative:     The following orders were created for panel order Urinalysis with Reflex Culture and Microscopic.  Procedure                               Abnormality         Status                     ---------                               -----------         ------                     Urinalysis with Reflex C...[[id number]]                                                 Extra Urine Gray Tube[[id number]]                                                         Please view results for these tests on the individual orders.   CBC WITH AUTO DIFFERENTIAL   MAGNESIUM   COMPREHENSIVE METABOLIC PANEL   PROSTATE SPECIFIC ANTIGEN   URINALYSIS WITH REFLEX CULTURE AND MICROSCOPIC   EXTRA URINE GRAY TUBE       All other labs were within normal range or not returned as of this dictation.    Imaging  No orders to display        Procedures  Procedures     EMERGENCY DEPARTMENT COURSE/MDM:   Medical Decision Making  74-year-old male presents emergency department with chief complaint of urinary retention.  Medical management treatment emergency department will be to place a Hull catheter to relieve the patient's obstruction initially.  We will also be drawing labs to evaluate kidney function.  3 separate attempts were made to place a Hull catheter however we were unable.  Consult was placed for urology conversation was held with Dr. Reyes he indicated to send the patient to his office.  However continued attempts were able to drain the  patient's bladder.  Conversation was had with urology they indicated they will come and place a three-way Hull catheter.  Urology came and evaluated patient emergency department and indicated they no longer need a three-way Hull catheter as the blood is clearing.  They will be admitting the patient for observation status and follow-up with urology.  Diagnoses as of 09/03/24 1533   Bladder outlet obstruction        Patient and or family in agreement and understanding of treatment plan.  All questions answered.      I reviewed the case with the attending ED physician. The attending ED physician agrees with the plan. Patient and/or patient´s representative was counseled regarding labs, imaging, likely diagnosis, and plan. All questions were answered.    ED Medications administered this visit:  Medications - No data to display    New Prescriptions from this visit:    New Prescriptions    No medications on file       Follow-up:  No follow-up provider specified.      Final Impression: No diagnosis found.      (Please note that portions of this note were completed with a voice recognition program.  Efforts were made to edit the dictations but occasionally words are mis-transcribed.)     Pradeep Zamudio MD  Resident  09/03/24 5209

## 2024-09-03 NOTE — CONSULTS
[facility]  Consult Note    CC:   Chief Complaint   Patient presents with    Difficulty Urinating     Pt states he has not peed since yesterday,     UTI     Burning when trying to pee,     Flank Pain     HPI: Kameron Candelaria is a 74 y.o. malewho I have been consulted to see regarding urinary retention/BPH.    Interval Hx: 74-year-old male who presents to the emergency room today with complaint of difficulty urinating.  Patient has been unable to void since last night.  He has known history of BPH.  Reports he is only voided dribbles.  Reports significant abdominal fullness and suprapubic discomfort radiating to the back.  Denies associated fever, chills, or nausea.  Hull catheter was initially attempted by ER nursing staff.  They initially had difficulty placing the catheter, but were successful with an 18 Arabic coudé catheter.  Urine was initially hematuric, but has cleared with drainage.  Patient reports relief of his suprapubic discomfort.  PSA previously noted to be elevated to 8.6 in November of last year.  Subsequent MRI did not show any worrisome lesions.  Prostate volume was noted to be 346 g.  At that time, surgical intervention was discussed for outlet obstruction, but the patient deferred.    ROS: 12pt ROS otherwise negative unless stated above in HPI      Past Medical History:   Diagnosis Date    Benign prostatic hyperplasia with lower urinary tract symptoms     Enlarged prostate with lower urinary tract symptoms (LUTS)    Overweight     Overweight    Personal history of other diseases of urinary system     History of hematuria    Personal history of other endocrine, nutritional and metabolic disease     History of morbid obesity    Personal history of other specified conditions     History of fatigue    Presence of other specified devices 02/07/2022    Indwelling Hull catheter present    Tinea unguium     Onychomycosis of toenail     Past Surgical History:   Procedure Laterality Date    OTHER  SURGICAL HISTORY  02/06/2022    Colonoscopy    OTHER SURGICAL HISTORY  01/25/2022    Hand surgery     Social History     Socioeconomic History    Marital status:    Tobacco Use    Smoking status: Never    Smokeless tobacco: Never   Substance and Sexual Activity    Alcohol use: Yes     Alcohol/week: 2.0 - 3.0 standard drinks of alcohol     Types: 2 - 3 Cans of beer per week    Drug use: Never     Family History   Problem Relation Name Age of Onset    No Known Problems Mother      No Known Problems Father       No Known Allergies  Scheduled medications    Continuous medications    PRN medications      Objective                                                                                                                                 No intake or output data in the 24 hours ending 09/03/24 1652  Lab Results   Component Value Date    WBC 11.4 (H) 09/03/2024    HGB 13.9 09/03/2024    HCT 41.7 09/03/2024    MCV 85 09/03/2024     09/03/2024     Lab Results   Component Value Date    GLUCOSE 178 (H) 09/03/2024    CALCIUM 9.5 09/03/2024     (L) 09/03/2024    K 4.7 09/03/2024    CO2 26 09/03/2024     09/03/2024    BUN 21 09/03/2024    CREATININE 1.43 (H) 09/03/2024     Lab Results   Component Value Date    ALT 23 09/03/2024    AST 16 09/03/2024    ALKPHOS 74 09/03/2024    BILITOT 1.0 09/03/2024     Urinalysis:       Component  Ref Range & Units 13:49 2 yr ago   Color, Urine  Light-Yellow, Yellow, Dark-Yellow Light-Brown Normal (N) YELLOW R   Appearance, Urine  Clear Turbid Normal (N) CLEAR R   Specific Gravity, Urine  1.005 - 1.035 1.009 1.010   pH, Urine  5.0, 5.5, 6.0, 6.5, 7.0, 7.5, 8.0 6.0 5.0 R   Protein, Urine  NEGATIVE, 10 (TRACE), 20 (TRACE) mg/dL 30 (1+) Abnormal  NEGATIVE R   Glucose, Urine  Normal mg/dL 100 (1+) Abnormal  NEGATIVE R   Blood, Urine  NEGATIVE OVER (3+) Abnormal  NEGATIVE   Ketones, Urine  NEGATIVE mg/dL NEGATIVE NEGATIVE   Bilirubin, Urine  NEGATIVE NEGATIVE NEGATIVE    Urobilinogen, Urine  Normal mg/dL Normal <2.0 R   Nitrite, Urine  NEGATIVE NEGATIVE NEGATIVE   Leukocyte Esterase, Urine  NEGATIVE NEGATIVE NEGATIVE   Resulting Agency [facility]        Imaging                                                                                                                                 CT abdomen/pelvis:    IMPRESSION:  1. No acute process.  2. Markedly enlarged prostate.  3. Nonobstructing left renal calculus measuring 6 mm without ureteral  calculus.  4. Mild hepatic steatosis.  5. Small hiatal hernia.  6. Mild mesenteric panniculitis    Physical Exam                                                                                                                      Vitals:    09/03/24 1500   BP: 164/82   Pulse: 83   Resp: 18   Temp:    SpO2: 96%       General: in NAD, appears stated age  Head: normocephalic, atraumatic  Neck: supple; trachea is midline  Respiratory: normal effort, no use of accessory muscles  Cardiovascular: no peripheral edema  Abdomen: soft, nondistended, nontender, no rebound or guarding, no organomegaly, no CVA tenderness, no hernia  Lymphatic: no lymphadenopathy noted  Skin: normal turgor, no rashes  Neurologic: grossly intact, oriented to person/place/time  Psychiatric: mode and affect appropriate  : normal phallus, normal meatus, scrotum normal, testicles normal bilaterally, epididymis normal bilaterally  Hull catheter in place draining light pink urine    Assessment & Plan                                                                                                              Kameron Candelaria is a 74 y.o. male     Impression:  1.  Urinary retention  2.  BPH    Plan: Maintain Hull catheter.  Will observe overnight to ensure hematuria improves/clears.  Will plan to discharge with Hull catheter in place and outpatient follow-up with Dr. Thomas Villar to discuss option of HoLEP.  May also consider prostate artery embolization given the extremely large size  of the prostate    Reviewed and approved by IVA TRINIDAD on 9/3/24 at 4:52 PM.

## 2024-09-03 NOTE — H&P
History Of Present Illness  74 YOM PMH of BPH, previous episode of acute urinary retention presenting with acute urinary retention.    Began last night with suprapubic pressure, could not urinate,, over night the pressure continued to worsen, and he felt like it started to radiate to the back.  This morning pain worsened and still could not urinate prompting him to present to the ED.  Denies fever, chills, night sweats, chest pain, SOB, N/V/D or rash.    In the ED vitals WNL.  CBC showed a mild leukocytosis of 11.4 & CMP showed a MARY of 1.43 (baseline 1.0).  Mutliple attempts of zarate placement occurred without success. 18F placed, and returned gross hematuria.  ED spoke with urology and urology recommended admission, and would evaluate the patient and considering changing zarate to triple lumen catheter.  Patient admitted for acute urinary retention with gross hematuria.     Past Medical History  He has a past medical history of Benign prostatic hyperplasia with lower urinary tract symptoms, Overweight, Personal history of other diseases of urinary system, Personal history of other endocrine, nutritional and metabolic disease, Personal history of other specified conditions, Presence of other specified devices (02/07/2022), and Tinea unguium.    Surgical History  He has a past surgical history that includes Other surgical history (02/06/2022) and Other surgical history (01/25/2022).     Social History  He reports that he has never smoked. He has never used smokeless tobacco. He reports current alcohol use of about 2.0 - 3.0 standard drinks of alcohol per week. He reports that he does not use drugs.    Family History  Family History   Problem Relation Name Age of Onset    No Known Problems Mother      No Known Problems Father          Allergies  Patient has no known allergies.    Review of Systems    12 pt ROS obtained: positives & pertinent negatives listed in HPI   Physical Exam   Constitutional: A&Ox4, NAD, resting  comfortable   Head and Face: Atraumatic, normocephalic   Eyes: Normal external exam, EOMI  ENT: Normal external inspection of ears and nose. Oropharynx normal.  Cardiovascular: RRR, S1/S2, no murmurs, rubs, or gallops, radial pulses +2  Pulmonary: CTAB, no respiratory distress, no wheezing, rales or rhonchi, on RA  Abdomen: +BS, soft, non-tender, nondistended, no guarding rigidity or rebound tenderness, no masses noted  G/U: zarate inplace with adequate UOP, showing gross hematuria, no CVA tenderness  MSK: Negative for edema, No joint swelling, normal movements of all extremities.   Neuro: No focal deficits, normal motor function, normal sensation, follows all commands  Skin- No lesions, contusions, or erythema.  Psychiatric: Judgment intact. Appropriate mood, affect and behavior   Last Recorded Vitals  /82 (BP Location: Right arm, Patient Position: Lying)   Pulse 83   Temp 36.5 °C (97.7 °F) (Temporal)   Resp 18   Wt 109 kg (240 lb)   SpO2 96%     Relevant Results  Results for orders placed or performed during the hospital encounter of 09/03/24 (from the past 24 hour(s))   CBC and Auto Differential   Result Value Ref Range    WBC 11.4 (H) 4.4 - 11.3 x10*3/uL    nRBC 0.0 0.0 - 0.0 /100 WBCs    RBC 4.93 4.50 - 5.90 x10*6/uL    Hemoglobin 13.9 13.5 - 17.5 g/dL    Hematocrit 41.7 41.0 - 52.0 %    MCV 85 80 - 100 fL    MCH 28.2 26.0 - 34.0 pg    MCHC 33.3 32.0 - 36.0 g/dL    RDW 12.5 11.5 - 14.5 %    Platelets 204 150 - 450 x10*3/uL    Neutrophils % 72.0 40.0 - 80.0 %    Immature Granulocytes %, Automated 0.3 0.0 - 0.9 %    Lymphocytes % 15.2 13.0 - 44.0 %    Monocytes % 8.3 2.0 - 10.0 %    Eosinophils % 3.1 0.0 - 6.0 %    Basophils % 1.1 0.0 - 2.0 %    Neutrophils Absolute 8.18 (H) 1.60 - 5.50 x10*3/uL    Immature Granulocytes Absolute, Automated 0.03 0.00 - 0.50 x10*3/uL    Lymphocytes Absolute 1.73 0.80 - 3.00 x10*3/uL    Monocytes Absolute 0.94 (H) 0.05 - 0.80 x10*3/uL    Eosinophils Absolute 0.35 0.00 -  0.40 x10*3/uL    Basophils Absolute 0.12 (H) 0.00 - 0.10 x10*3/uL   Magnesium   Result Value Ref Range    Magnesium 1.92 1.60 - 2.40 mg/dL   Comprehensive metabolic panel   Result Value Ref Range    Glucose 178 (H) 74 - 99 mg/dL    Sodium 135 (L) 136 - 145 mmol/L    Potassium 4.7 3.5 - 5.3 mmol/L    Chloride 102 98 - 107 mmol/L    Bicarbonate 26 21 - 32 mmol/L    Anion Gap 12 10 - 20 mmol/L    Urea Nitrogen 21 6 - 23 mg/dL    Creatinine 1.43 (H) 0.50 - 1.30 mg/dL    eGFR 51 (L) >60 mL/min/1.73m*2    Calcium 9.5 8.6 - 10.3 mg/dL    Albumin 4.3 3.4 - 5.0 g/dL    Alkaline Phosphatase 74 33 - 136 U/L    Total Protein 7.5 6.4 - 8.2 g/dL    AST 16 9 - 39 U/L    Bilirubin, Total 1.0 0.0 - 1.2 mg/dL    ALT 23 10 - 52 U/L   Urinalysis with Reflex Culture and Microscopic   Result Value Ref Range    Color, Urine Light-Brown (N) Light-Yellow, Yellow, Dark-Yellow    Appearance, Urine Turbid (N) Clear    Specific Gravity, Urine 1.009 1.005 - 1.035    pH, Urine 6.0 5.0, 5.5, 6.0, 6.5, 7.0, 7.5, 8.0    Protein, Urine 30 (1+) (A) NEGATIVE, 10 (TRACE), 20 (TRACE) mg/dL    Glucose, Urine 100 (1+) (A) Normal mg/dL    Blood, Urine OVER (3+) (A) NEGATIVE    Ketones, Urine NEGATIVE NEGATIVE mg/dL    Bilirubin, Urine NEGATIVE NEGATIVE    Urobilinogen, Urine Normal Normal mg/dL    Nitrite, Urine NEGATIVE NEGATIVE    Leukocyte Esterase, Urine NEGATIVE NEGATIVE   Urinalysis Microscopic   Result Value Ref Range    WBC, Urine >50 (A) 1-5, NONE /HPF    WBC Clumps, Urine RARE Reference range not established. /HPF    RBC, Urine >20 (A) NONE, 1-2, 3-5 /HPF   ECG 12 lead   Result Value Ref Range    Ventricular Rate 83 BPM    Atrial Rate 83 BPM    MA Interval 164 ms    QRS Duration 64 ms    QT Interval 350 ms    QTC Calculation(Bazett) 411 ms    P Axis 52 degrees    R Axis 38 degrees    T Axis 32 degrees    QRS Count 14 beats    Q Onset 229 ms    P Onset 147 ms    P Offset 199 ms    T Offset 404 ms    QTC Fredericia 390 ms           CT abdomen pelvis  w IV contrast    Result Date: 9/3/2024  STUDY: CT Abdomen and Pelvis with IV Contrast; 09/03/2024, 2:27 PM. INDICATION: Urinary obstruction. COMPARISON: CT urogram: 03/03/22. ACCESSION NUMBER(S): [id number] ORDERING CLINICIAN: KAY WEST TECHNIQUE: CT of the abdomen and pelvis was performed.  Contiguous axial images were obtained at 3 mm slice thickness through the abdomen and pelvis. Coronal and sagittal reconstructions at 3 mm slice thickness were performed.  Omnipaque 350 90 mL was administered intravenously.  FINDINGS: LOWER CHEST: No cardiomegaly.  No pericardial effusion.  Lung bases are clear.  ABDOMEN:  LIVER: No hepatomegaly.  Smooth surface contour.  Mild fatty infiltration of the liver  BILE DUCTS: No intrahepatic or extrahepatic biliary ductal dilatation.  GALLBLADDER: The gallbladder is present without gallstones. STOMACH: Small hiatal hernia.  PANCREAS: No masses or ductal dilatation.  SPLEEN: No splenomegaly or focal splenic lesion.  ADRENAL GLANDS: No thickening or nodules.  KIDNEYS AND URETERS: Kidneys are normal in size and location.  Nonobstructing left renal calculus measuring 6 mm without ureteral calculus.  Bilateral renal cysts measuring up to 7 cm on the right.  PELVIS:  BLADDER: Urinary bladder is decompressed around a Hull catheter  REPRODUCTIVE ORGANS: Prostate gland is markedly enlarged measuring 9 cm  BOWEL: No abnormalities identified.  VESSELS: No abnormalities identified.  Abdominal aorta is normal in caliber.  PERITONEUM/RETROPERITONEUM/LYMPH NODES: No free fluid.  No pneumoperitoneum.  Small fat-containing left inguinal hernia.  Mild stranding of the small bowel mesentery with scattered nonenlarged lymph nodes. No lymphadenopathy.  ABDOMINAL WALL: No abnormalities identified. SOFT TISSUES: No abnormalities identified.  BONES: No acute fracture or aggressive osseous lesion.    1. No acute process. 2. Markedly enlarged prostate. 3. Nonobstructing left renal calculus measuring  6 mm without ureteral calculus. 4. Mild hepatic steatosis. 5. Small hiatal hernia. 6. Mild mesenteric panniculitis. Signed by Alex Buck MD    ECG 12 lead    Result Date: 9/3/2024  Normal sinus rhythm Anterior infarct , age undetermined Abnormal ECG No previous ECGs available        Assessment/Plan   Assessment & Plan  Bladder outlet obstruction    Hematuria  MARY without CKD  Acute urinary retention   Hx of NIDDM2  Hx of essential HTN  Hx of HLD     74 YOM PMH of BPH, previous episode of acute urinary retention presenting with acute urinary retention. Had not been able to urinate for 12 hours.  In the ED vitals WNL.  CBC showed a mild leukocytosis of 11.4 & CMP showed a MARY of 1.43 (baseline 1.0).  Mutliple attempts of zarate placement occurred without success. 18F placed, and returned gross hematuria.  Urology following.    - IVF and trend RFP  - Zarate in place, management per urology , appreciate recommendations   - Hold metformin and glimepiride while inpatient, SSI   - Continue home flomax  - Continue home norvasc and statin    IVF: LR  DVT Ppx: Holding due to gross hematuria  Diet: Diabetic diet  Code Status: FULL CODE Confirmed at bedside    Admitted for acute urinary retention along with MARY requiring Zarate catheter placement now has gross hematuria.  May need continuous bladder irrigation, requiring urology consultation.  Anticipate 2 midnight stays.  Complexity moderate       Alan Gross DO

## 2024-09-04 VITALS
SYSTOLIC BLOOD PRESSURE: 123 MMHG | WEIGHT: 240 LBS | HEART RATE: 73 BPM | HEIGHT: 66 IN | BODY MASS INDEX: 38.57 KG/M2 | TEMPERATURE: 98.2 F | DIASTOLIC BLOOD PRESSURE: 68 MMHG | OXYGEN SATURATION: 97 % | RESPIRATION RATE: 18 BRPM

## 2024-09-04 LAB
ANION GAP SERPL CALC-SCNC: 10 MMOL/L (ref 10–20)
BASOPHILS # BLD AUTO: 0.09 X10*3/UL (ref 0–0.1)
BASOPHILS NFR BLD AUTO: 1 %
BUN SERPL-MCNC: 15 MG/DL (ref 6–23)
CALCIUM SERPL-MCNC: 8.6 MG/DL (ref 8.6–10.3)
CHLORIDE SERPL-SCNC: 105 MMOL/L (ref 98–107)
CO2 SERPL-SCNC: 27 MMOL/L (ref 21–32)
CREAT SERPL-MCNC: 0.92 MG/DL (ref 0.5–1.3)
EGFRCR SERPLBLD CKD-EPI 2021: 87 ML/MIN/1.73M*2
EOSINOPHIL # BLD AUTO: 0.35 X10*3/UL (ref 0–0.4)
EOSINOPHIL NFR BLD AUTO: 3.8 %
ERYTHROCYTE [DISTWIDTH] IN BLOOD BY AUTOMATED COUNT: 12.9 % (ref 11.5–14.5)
GLUCOSE BLD MANUAL STRIP-MCNC: 144 MG/DL (ref 74–99)
GLUCOSE BLD MANUAL STRIP-MCNC: 208 MG/DL (ref 74–99)
GLUCOSE SERPL-MCNC: 152 MG/DL (ref 74–99)
HCT VFR BLD AUTO: 38.7 % (ref 41–52)
HGB BLD-MCNC: 12.7 G/DL (ref 13.5–17.5)
HOLD SPECIMEN: NORMAL
IMM GRANULOCYTES # BLD AUTO: 0.03 X10*3/UL (ref 0–0.5)
IMM GRANULOCYTES NFR BLD AUTO: 0.3 % (ref 0–0.9)
LYMPHOCYTES # BLD AUTO: 1.74 X10*3/UL (ref 0.8–3)
LYMPHOCYTES NFR BLD AUTO: 18.9 %
MAGNESIUM SERPL-MCNC: 1.83 MG/DL (ref 1.6–2.4)
MCH RBC QN AUTO: 28.3 PG (ref 26–34)
MCHC RBC AUTO-ENTMCNC: 32.8 G/DL (ref 32–36)
MCV RBC AUTO: 86 FL (ref 80–100)
MONOCYTES # BLD AUTO: 0.94 X10*3/UL (ref 0.05–0.8)
MONOCYTES NFR BLD AUTO: 10.2 %
NEUTROPHILS # BLD AUTO: 6.05 X10*3/UL (ref 1.6–5.5)
NEUTROPHILS NFR BLD AUTO: 65.8 %
NRBC BLD-RTO: 0 /100 WBCS (ref 0–0)
PLATELET # BLD AUTO: 186 X10*3/UL (ref 150–450)
POTASSIUM SERPL-SCNC: 4.1 MMOL/L (ref 3.5–5.3)
RBC # BLD AUTO: 4.48 X10*6/UL (ref 4.5–5.9)
SODIUM SERPL-SCNC: 138 MMOL/L (ref 136–145)
WBC # BLD AUTO: 9.2 X10*3/UL (ref 4.4–11.3)

## 2024-09-04 PROCEDURE — 82947 ASSAY GLUCOSE BLOOD QUANT: CPT

## 2024-09-04 PROCEDURE — 36415 COLL VENOUS BLD VENIPUNCTURE: CPT | Performed by: INTERNAL MEDICINE

## 2024-09-04 PROCEDURE — 2500000002 HC RX 250 W HCPCS SELF ADMINISTERED DRUGS (ALT 637 FOR MEDICARE OP, ALT 636 FOR OP/ED): Performed by: INTERNAL MEDICINE

## 2024-09-04 PROCEDURE — 97165 OT EVAL LOW COMPLEX 30 MIN: CPT | Mod: GO

## 2024-09-04 PROCEDURE — 99238 HOSP IP/OBS DSCHRG MGMT 30/<: CPT | Performed by: INTERNAL MEDICINE

## 2024-09-04 PROCEDURE — 2500000004 HC RX 250 GENERAL PHARMACY W/ HCPCS (ALT 636 FOR OP/ED): Performed by: INTERNAL MEDICINE

## 2024-09-04 PROCEDURE — 99231 SBSQ HOSP IP/OBS SF/LOW 25: CPT | Performed by: UROLOGY

## 2024-09-04 PROCEDURE — 2500000001 HC RX 250 WO HCPCS SELF ADMINISTERED DRUGS (ALT 637 FOR MEDICARE OP): Performed by: INTERNAL MEDICINE

## 2024-09-04 PROCEDURE — 97161 PT EVAL LOW COMPLEX 20 MIN: CPT | Mod: GP

## 2024-09-04 PROCEDURE — 83735 ASSAY OF MAGNESIUM: CPT | Performed by: INTERNAL MEDICINE

## 2024-09-04 PROCEDURE — G0378 HOSPITAL OBSERVATION PER HR: HCPCS

## 2024-09-04 PROCEDURE — 85025 COMPLETE CBC W/AUTO DIFF WBC: CPT | Performed by: INTERNAL MEDICINE

## 2024-09-04 PROCEDURE — 82374 ASSAY BLOOD CARBON DIOXIDE: CPT | Performed by: INTERNAL MEDICINE

## 2024-09-04 RX ORDER — AMLODIPINE BESYLATE 5 MG/1
5 TABLET ORAL DAILY
Qty: 30 TABLET | Refills: 0 | Status: SHIPPED | OUTPATIENT
Start: 2024-09-04 | End: 2024-10-04

## 2024-09-04 ASSESSMENT — COGNITIVE AND FUNCTIONAL STATUS - GENERAL
MOBILITY SCORE: 24
DAILY ACTIVITIY SCORE: 24

## 2024-09-04 ASSESSMENT — PAIN SCALES - GENERAL
PAINLEVEL_OUTOF10: 0 - NO PAIN

## 2024-09-04 ASSESSMENT — PAIN - FUNCTIONAL ASSESSMENT
PAIN_FUNCTIONAL_ASSESSMENT: 0-10
PAIN_FUNCTIONAL_ASSESSMENT: 0-10

## 2024-09-04 ASSESSMENT — ACTIVITIES OF DAILY LIVING (ADL): BATHING_ASSISTANCE: STAND BY

## 2024-09-04 NOTE — PROGRESS NOTES
Physical Therapy    Physical Therapy Evaluation    Patient Name: Kameron Candelaria  MRN: [id number]  Today's Date: 9/4/2024   Time Calculation  Start Time: 1031  Stop Time: 1039  Time Calculation (min): 8 min  3135/3135-A    Assessment/Plan   PT Assessment: Pt appears at baseline level of function.  Pt was able to ambulate around room with IND.  Pt denies any concerns with returning home upon DC from the hospital.  Will DC PT orders at this time.    Evaluation/Treatment Tolerance: Patient tolerated treatment well  Medical Staff Made Aware: Yes  End of Session Communication: Bedside nurse  End of Session Patient Position: Up in chair, Alarm off, not on at start of session  IP OR SWING BED PT PLAN  Inpatient or Swing Bed: Inpatient  PT Plan  PT Plan: PT Eval only  PT Eval Only Reason: At baseline function  PT Frequency: PT eval only  PT Discharge Recommendations: No further acute PT, No PT needed after discharge  PT Recommended Transfer Status: Independent  PT - OK to Discharge: Yes - To next level of care when cleared by medical team    Subjective     Current Problem:  1. Bladder outlet obstruction  Referral to Primary Care - Family Practice      2. Primary hypertension  amLODIPine (Norvasc) 5 mg tablet          Past Medical History:  Patient Active Problem List   Diagnosis    Anemia    Atrophic gastritis    BPH with obstruction/lower urinary tract symptoms    Diabetes mellitus (Multi)    Dupuytren contracture    Elevated PSA    GERD without esophagitis    Gross hematuria    Hyperlipidemia    Hypertension    OAB (overactive bladder)    PAD (peripheral artery disease) ([facility])    Peripheral neuropathy    Pleurodynia    Straining to void    Urine retention    Obesity, morbid (Multi)    Well adult health check    BMI 38.0-38.9,adult    History of obesity    Onychomycosis of toenail    Overweight    Bladder outlet obstruction    Hematuria       General Visit Information:  Per EMR: pt  presenting with acute urinary retention.      Began last night with suprapubic pressure, could not urinate,, over night the pressure continued to worsen, and he felt like it started to radiate to the back.  This morning pain worsened and still could not urinate prompting him to present to the ED.  Denies fever, chills, night sweats, chest pain, SOB, N/V/D or rash.     In the ED vitals WNL.  CBC showed a mild leukocytosis of 11.4 & CMP showed a MARY of 1.43 (baseline 1.0).  Mutliple attempts of zarate placement occurred without success. 18F placed, and returned gross hematuria.  ED spoke with urology and urology recommended admission, and would evaluate the patient and considering changing zarate to triple lumen catheter.  Patient admitted for acute urinary retention with gross hematuria.    On arrival, pt supine in bed.  Pt in no apparent distress and agreeable to therapy.    General  Reason for Referral: impaired mobility  Referred By: Soren Villa  Past Medical History Relevant to Rehab: BPH  Co-Treatment: OT  Prior to Session Communication: Bedside nurse  Patient Position Received: Bed, 3 rail up, Alarm off, not on at start of session    Home Living/PLOF:  Pt lives with wife I house with 3 BOBBY with rail.  1 floor set up.  Has WIS with Gbs.  Indep with mobility and ADLs.  Pt does not own or use any device.  Pt drives and shares IADLs with wife.  Pt denies any falls.     Precautions:  Precautions  Medical Precautions: No known precautions/limitation     Objective     Pain:  Pain Assessment  Pain Assessment: 0-10  0-10 (Numeric) Pain Score: 0 - No pain    Cognition:  Cognition  Overall Cognitive Status: Within Functional Limits  Orientation Level: Oriented X4    General Assessments:      Activity Tolerance  Endurance: Endurance does not limit participation in activity  Sensation  Sensation Comment: pt denies any numbness/tingling    Static Sitting Balance  Static Sitting-Comment/Number of Minutes: good  Dynamic Sitting Balance  Dynamic Sitting-Comments:  good  Static Standing Balance  Static Standing-Comment/Number of Minutes: good  Dynamic Standing Balance  Dynamic Standing-Comments: good    Extremity/Trunk Assessments:  BLE strength: WFL    Functional Mobility:  Bed mobility  Supine to sit: Mod I    Transfers  Sit to stand: IND  Stand to sit: IND    Ambulation/Stairs  Pt ambulated 20 ft with IND; no LOB noted     Outcome Measures:  [facility] Basic Mobility  Turning from your back to your side while in a flat bed without using bedrails: None  Moving from lying on your back to sitting on the side of a flat bed without using bedrails: None  Moving to and from bed to chair (including a wheelchair): None  Standing up from a chair using your arms (e.g. wheelchair or bedside chair): None  To walk in hospital room: None  Climbing 3-5 steps with railing: None  Basic Mobility - Total Score: 24    Education Documentation  Body Mechanics, taught by Kamilla Hollis, PT at 9/4/2024 12:11 PM.  Learner: Patient  Readiness: Acceptance  Method: Explanation  Response: Verbalizes Understanding    Mobility Training, taught by Kamilla Hollis PT at 9/4/2024 12:11 PM.  Learner: Patient  Readiness: Acceptance  Method: Explanation  Response: Verbalizes Understanding    Education Comments  No comments found.

## 2024-09-04 NOTE — CARE PLAN
The patient's goals for the shift include      The clinical goals for the shift include will be free of hematuria    Urine light pink to clear no clots noted.      Problem: Chronic Conditions and Co-morbidities  Goal: Patient's chronic conditions and co-morbidity symptoms are monitored and maintained or improved  Outcome: Progressing     Problem: Pain - Adult  Goal: Verbalizes/displays adequate comfort level or baseline comfort level  Outcome: Progressing

## 2024-09-04 NOTE — NURSING NOTE
0230  Pt has 16 FR 10cc balloon urethral zarate cath in place. Draining light pink to clear urine no clots noted. Unable to determine if it is cude catheter.

## 2024-09-04 NOTE — NURSING NOTE
Pt discharged to home with a zarate, education on zarate care given, leg bag attached, regular bag provided to Pt. IV access discontinued with tip intact, Pt's family transporting Pt home.

## 2024-09-04 NOTE — DISCHARGE SUMMARY
Discharge Diagnosis  Bladder outlet obstruction    Issues Requiring Follow-Up  Follow-up with primary care provider and urology as outpatient    Discharge Meds     Medication List      CHANGE how you take these medications     tamsulosin 0.4 mg 24 hr capsule; Commonly known as: Flomax; TAKE 1   CAPSULE (0.4 MG) BY MOUTH ONCE DAILY; What changed: when to take this     CONTINUE taking these medications     amLODIPine 5 mg tablet; Commonly known as: Norvasc; Take 1 tablet (5 mg)   by mouth once daily.   atorvastatin 10 mg tablet; Commonly known as: Lipitor; Take 1 tablet (10   mg) by mouth once daily.   glimepiride 2 mg tablet; Commonly known as: Amaryl; Take 1 tablet (2 mg)   by mouth once daily.   lancets 33 gauge misc; 1 Lancet once daily. May provide what insurance   will cover.   metFORMIN 500 mg tablet; Commonly known as: Glucophage; Take 2 tablets   (1,000 mg) by mouth 2 times a day.   OneTouch Ultra Test strip; Generic drug: blood sugar diagnostic; 1 strip   by Does not apply route once daily.   TylenoL 325 mg tablet; Generic drug: acetaminophen       Test Results Pending At Discharge  Pending Labs       Order Current Status    Urine Culture In process            Hospital Course  74-year-old male with past medical history of BPH, history of urinary retention, hypertension, obesity, diabetes mellitus, hyperlipidemia presented to emergency department for inability to void.  Patient was found to have BPH causing bladder outlet obstruction.  Patient had multiple attempts to place a Hull catheter but they were unsuccessful and finally they got 1 and.  Urology was consulted and they recommended overnight observation to make sure that hematuria clears up.  Patient's PSA is elevated at 9.74.  Patient did has clearing of hematuria and his kidney function is stable and has a good urine output.  He will be discharged home with follow-up with urology to talk about outlet obstruction surgeries as outpatient.  He will be  discharged with a Hull catheter.    24 minutes spent in discharge timing    Pertinent Physical Exam At Time of Discharge  General: Not in acute distress, alert  HEENT: PERRLA, head intact and normocephalic  Neck: Normal to inspection  Lungs: Clear to auscultation, work of breathing within normal limit  Cardiac: Regular rate and rhythm  Abdomen: Soft nontender, positive bowel sounds  : Hull catheter in with ct-colored urine.  No bleeding noted  Skin: Intact  Hematology: No petechia or excessive ecchymosis  Musculoskeletal: Without significant trauma  Neurological: Alert awake oriented, no focal deficit, cranial nerves grossly intact  Psych: No suicidal ideation or homicidal ideation    Outpatient Follow-Up  Future Appointments   Date Time Provider Department Center   10/18/2024  1:40 PM MD FLORESITA Mcdonnell MD

## 2024-09-04 NOTE — PROGRESS NOTES
Kameron Candelaria is a 74 y.o. male on day 0 of admission presenting with Bladder outlet obstruction.    Subjective   Patient resting comfortably in bed.  No issues with Hull catheter overnight.       Objective     Last Recorded Vitals  Vitals:    09/04/24 0400   BP: 123/59   Pulse: 78   Resp: 16   Temp: 36.2 °C (97.2 °F)   SpO2: 95%       Intake/Output last 3 Shifts:  I/O last 3 completed shifts:  In: 565 (5.2 mL/kg) [I.V.:565 (5.2 mL/kg)]  Out: 2650 (24.3 mL/kg) [Urine:2650 (0.7 mL/kg/hr)]  Weight: 108.9 kg     Exam:  General: in NAD, appears stated age  Head: normocephalic, atraumatic  Respiratory: normal effort, no use of accessory muscles  Cardiovascular: no edema noted  Skin: normal turgor, no rashes  Neurologic: grossly intact, oriented to person/place/time  Psychiatric: mode and affect appropriate  Urine: Clear      Relevant Results    Lab Results   Component Value Date    WBC 11.4 (H) 09/03/2024    HGB 13.9 09/03/2024    HCT 41.7 09/03/2024    MCV 85 09/03/2024     09/03/2024     Lab Results   Component Value Date    GLUCOSE 178 (H) 09/03/2024    CALCIUM 9.5 09/03/2024     (L) 09/03/2024    K 4.7 09/03/2024    CO2 26 09/03/2024     09/03/2024    BUN 21 09/03/2024    CREATININE 1.43 (H) 09/03/2024                       This patient has a urinary catheter   Reason for the urinary catheter remaining today? urinary retention/bladder outlet obstruction, acute or chronic               Assessment/Plan   Principal Problem:    Bladder outlet obstruction  Active Problems:    Hematuria    Hematuria resolving.  Okay to discharge from urology standpoint.  Will need outpatient follow-up to discuss bladder outlet surgeries      Wilfredo Reyes MD

## 2024-09-04 NOTE — HOSPITAL COURSE
74-year-old male with past medical history of BPH, history of urinary retention, hypertension, obesity, diabetes mellitus, hyperlipidemia presented to emergency department for inability to void.  Patient was found to have BPH causing bladder outlet obstruction.  Patient had multiple attempts to place a Hull catheter but they were unsuccessful and finally they got 1 and.  Urology was consulted and they recommended overnight observation to make sure that hematuria clears up.  Patient's PSA is elevated at 9.74.  Patient did has clearing of hematuria and his kidney function is stable and has a good urine output.  He will be discharged home with follow-up with urology to talk about outlet obstruction surgeries as outpatient.  He will be discharged with a Hull catheter.    24 minutes spent in discharge timing

## 2024-09-04 NOTE — PROGRESS NOTES
Occupational Therapy    Occupational Therapy    Evaluation    Patient Name: Kameron Candelaria  MRN: [id number]  Today's Date: 9/4/2024  Time Calculation  Start Time: 1031  Stop Time: 1041  Time Calculation (min): 10 min  3135/3135-A    Assessment  IP OT Assessment  OT Assessment: Pt pleasant and cooperative. Pt agrees he is close to baseline level of function, which is independent. Pt does not have acute OT needs at this time, and OT services will be discharged.  Prognosis: Good  End of Session Communication: Bedside nurse  End of Session Patient Position: Up in chair, Alarm off, not on at start of session    Plan:  No Skilled OT: At baseline function  OT Frequency: OT eval only  OT Discharge Recommendations: No further acute OT  OT Recommended Transfer Status: Independent  OT - OK to Discharge: Yes (to next level of care)    Subjective     Current Problem:  1. Bladder outlet obstruction  Referral to Primary Care - Family Practice      2. Primary hypertension  amLODIPine (Norvasc) 5 mg tablet          General:  General  Reason for Referral: ADL's; Safety Assessment  Referred By: Soren Villa  Past Medical History Relevant to Rehab: BPH  Co-Treatment: PT  Prior to Session Communication: Bedside nurse  Patient Position Received: Bed, 3 rail up, Alarm off, not on at start of session    General Visit Information:  Per EMR: pt  presenting with acute urinary retention.     Began last night with suprapubic pressure, could not urinate,, over night the pressure continued to worsen, and he felt like it started to radiate to the back.  This morning pain worsened and still could not urinate prompting him to present to the ED.  Denies fever, chills, night sweats, chest pain, SOB, N/V/D or rash.     In the ED vitals WNL.  CBC showed a mild leukocytosis of 11.4 & CMP showed a MARY of 1.43 (baseline 1.0).  Mutliple attempts of zarate placement occurred without success. 18F placed, and returned gross hematuria.  ED spoke with urology and  "urology recommended admission, and would evaluate the patient and considering changing zarate to triple lumen catheter.  Patient admitted for acute urinary retention with gross hematuria.     On arrival, pt supine in bed.  Pt in no apparent distress and agreeable to therapy.    Precautions:  Medical Precautions: No known precautions/limitation    Pain:  Pain Assessment  Pain Assessment: 0-10  0-10 (Numeric) Pain Score: 0 - No pain    Objective     Cognition:  Overall Cognitive Status: Within Functional Limits  Orientation Level: Oriented X4    Home Living/PLOF:  Pt lives with wife I house with 3 BOBBY with rail.  1 floor set up.  Has WIS with Gbs.  Indep with mobility and ADLs.  Pt does not own or use any device.  Pt drives and shares IADLs with wife. Pt stated that he \"still gets up on the roof\". Pt denies any falls.     ADL:  Eating Assistance: Independent  Grooming Assistance: Independent  Bathing Assistance: Stand by  UE Dressing Assistance: Independent  LE Dressing Assistance: Stand by  Toileting Assistance with Device: Independent    Activity Tolerance:  Endurance: Endurance does not limit participation in activity    Functional Mobility:  Bed mobility  Supine to sit: Mod I     Transfers  Sit to stand: IND  Stand to sit: IND     Ambulation/Stairs  Pt ambulated 20 ft with IND; no LOB noted     Sitting Balance:  Static Sitting Balance  Static Sitting-Comment/Number of Minutes: good  Dynamic Sitting Balance  Dynamic Sitting-Comments: good    Standing Balance:  Static Standing Balance  Static Standing-Comment/Number of Minutes: god  Dynamic Standing Balance  Dynamic Standing-Comments: fair+    Sensation:  Sensation Comment: pt denies any numbness/tingling    Strength:  Strength Comments: B UE's WFL    Extremities: RUE   RUE : Within Functional Limits and LUE   LUE: Within Functional Limits    Outcome Measures: [facility] Daily Activity  Putting on and taking off regular lower body clothing: None  Bathing (including " washing, rinsing, drying): None  Putting on and taking off regular upper body clothing: None  Toileting, which includes using toilet, bedpan or urinal: None  Taking care of personal grooming such as brushing teeth: None  Eating Meals: None  Daily Activity - Total Score: 24                       EDUCATION:  Education  Individual(s) Educated: Patient  Education Provided:  (safety)  Education Documentation  Body Mechanics, taught by Roseanne Guallpa OT at 9/4/2024  2:46 PM.  Learner: Patient  Readiness: Acceptance  Method: Explanation  Response: Verbalizes Understanding    Precautions, taught by Roseanne Guallpa OT at 9/4/2024  2:46 PM.  Learner: Patient  Readiness: Acceptance  Method: Explanation  Response: Verbalizes Understanding    Education Comments  No comments found.

## 2024-09-05 ENCOUNTER — PATIENT OUTREACH (OUTPATIENT)
Dept: PRIMARY CARE | Facility: CLINIC | Age: 74
End: 2024-09-05
Payer: COMMERCIAL

## 2024-09-05 ENCOUNTER — DOCUMENTATION (OUTPATIENT)
Dept: PRIMARY CARE | Facility: CLINIC | Age: 74
End: 2024-09-05
Payer: COMMERCIAL

## 2024-09-05 LAB — BACTERIA UR CULT: NO GROWTH

## 2024-09-05 NOTE — PROGRESS NOTES
Discharge Facility:  [facility]    Discharge Diagnosis:  Bladder outlet obstruction     Admission Date:  9/3/24  Discharge Date:   9/4/24    PCP Appointment Date:   9/9/2024 11:30 AM    PRIMARY CARE HOSP DISCHARGE   Sebastian Connelly MD     Specialist Appointment Date:   Urology-  10/18/2024  1:40 PM Thomas Villar MD     Hospital Encounter and Summary Linked: Yes    See discharge assessment below for further details  Medications  Medications reviewed with patient/caregiver?: Yes (9/5/2024 10:44 AM)  Is the patient having any side effects they believe may be caused by any medication additions or changes?: No (9/5/2024 10:44 AM)  Does the patient have all medications ordered at discharge?: Not applicable (9/5/2024 10:44 AM)  Care Management Interventions: Provided patient education (9/5/2024 10:44 AM)  Prescription Comments: No new , changed or stopped medications (9/5/2024 10:44 AM)  Is the patient taking all medications as directed (includes completed medication regime)?: Yes (9/5/2024 10:44 AM)  Care Management Interventions: Provided patient education (9/5/2024 10:44 AM)  Medication Comments: CM discussed referencing discharge paperwork to follow detailed daily medication schedule. Reminded to bring in all medications. (Old & New) to future appointments. Patient endorses understanding & compliance with medications. Pt will need refills soon so going to ask at PCP appt. I did let pt know that discharge papers state that Refill for Norvasc was sent to pharm yesterday. Pt will call Latoya cordova to confirm. (9/5/2024 10:44 AM)    Appointments  Does the patient have a primary care provider?: Yes (9/5/2024 10:44 AM)  Care Management Interventions: Verified appointment date/time/provider (CM made appt and confirmed with patient -date & time) (9/5/2024 10:44 AM)  Has the patient kept scheduled appointments due by today?: Yes (9/5/2024 10:44 AM)  Care Management Interventions: Advised to  schedule with specialist; Educated on importance of keeping appointment (9/5/2024 10:44 AM)    Self Management  Has home health visited the patient within 72 hours of discharge?: Not applicable (9/5/2024 10:44 AM)  What Durable Medical Equipment (DME) was ordered?: Pt discharged to home with a zarate, education on zarate care given, leg bag attached, regular bag provided to Pt. Pt confirmed that he is able to care for zarate and has urine Output at this time . (9/5/2024 10:44 AM)  Has all Durable Medical Equipment (DME) been delivered?: Yes (9/5/2024 10:44 AM)    Patient Teaching  Does the patient have access to their discharge instructions?: Yes (9/5/2024 10:44 AM)  Care Management Interventions: Reviewed instructions with patient (9/5/2024 10:44 AM)  What is the patient's perception of their health status since discharge?: Improving (9/5/2024 10:44 AM)  Is the patient/caregiver able to teach back the hierarchy of who to call/visit for symptoms/problems? PCP, Specialist, Home Health nurse, Urgent Care, ED, 911: Yes (9/5/2024 10:44 AM)  Patient/Caregiver Education Comments: Successful transition of care outreach with patient. CM introduced myself and the TCM program to Kameron Candelaria. Reviewed hospital stay and answered any questions. Patient denies any further discharge questions/needs at this time. CM gave my contact information and encouraged to call if needing assistance or has any further non-emergent questions prior to my next outreach. (9/5/2024 10:44 AM)

## 2024-09-06 ENCOUNTER — HOSPITAL ENCOUNTER (EMERGENCY)
Facility: HOSPITAL | Age: 74
Discharge: HOME | End: 2024-09-06
Attending: EMERGENCY MEDICINE
Payer: COMMERCIAL

## 2024-09-06 VITALS
TEMPERATURE: 97.2 F | SYSTOLIC BLOOD PRESSURE: 153 MMHG | DIASTOLIC BLOOD PRESSURE: 72 MMHG | RESPIRATION RATE: 18 BRPM | HEIGHT: 66 IN | OXYGEN SATURATION: 95 % | WEIGHT: 240 LBS | HEART RATE: 72 BPM | BODY MASS INDEX: 38.57 KG/M2

## 2024-09-06 DIAGNOSIS — N30.90 CYSTITIS: ICD-10-CM

## 2024-09-06 DIAGNOSIS — R31.0 GROSS HEMATURIA: Primary | ICD-10-CM

## 2024-09-06 LAB
APPEARANCE UR: ABNORMAL
BILIRUB UR STRIP.AUTO-MCNC: NEGATIVE MG/DL
COLOR UR: ABNORMAL
GLUCOSE UR STRIP.AUTO-MCNC: NORMAL MG/DL
HOLD SPECIMEN: NORMAL
KETONES UR STRIP.AUTO-MCNC: NEGATIVE MG/DL
LEUKOCYTE ESTERASE UR QL STRIP.AUTO: ABNORMAL
MUCOUS THREADS #/AREA URNS AUTO: ABNORMAL /LPF
NITRITE UR QL STRIP.AUTO: NEGATIVE
PH UR STRIP.AUTO: 7 [PH]
PROT UR STRIP.AUTO-MCNC: ABNORMAL MG/DL
RBC # UR STRIP.AUTO: ABNORMAL /UL
RBC #/AREA URNS AUTO: >20 /HPF
SP GR UR STRIP.AUTO: 1.01
SQUAMOUS #/AREA URNS AUTO: ABNORMAL /HPF
UROBILINOGEN UR STRIP.AUTO-MCNC: NORMAL MG/DL
WBC #/AREA URNS AUTO: >50 /HPF

## 2024-09-06 PROCEDURE — 51798 US URINE CAPACITY MEASURE: CPT

## 2024-09-06 PROCEDURE — 81001 URINALYSIS AUTO W/SCOPE: CPT

## 2024-09-06 PROCEDURE — 99284 EMERGENCY DEPT VISIT MOD MDM: CPT | Performed by: EMERGENCY MEDICINE

## 2024-09-06 PROCEDURE — 99283 EMERGENCY DEPT VISIT LOW MDM: CPT

## 2024-09-06 PROCEDURE — 87086 URINE CULTURE/COLONY COUNT: CPT | Mod: STJLAB

## 2024-09-06 RX ORDER — CEPHALEXIN 500 MG/1
500 CAPSULE ORAL 2 TIMES DAILY
Qty: 14 CAPSULE | Refills: 0 | Status: SHIPPED | OUTPATIENT
Start: 2024-09-06 | End: 2024-09-13

## 2024-09-06 ASSESSMENT — PAIN DESCRIPTION - ONSET: ONSET: ONGOING

## 2024-09-06 ASSESSMENT — PAIN DESCRIPTION - FREQUENCY: FREQUENCY: CONSTANT/CONTINUOUS

## 2024-09-06 ASSESSMENT — PAIN SCALES - GENERAL
PAINLEVEL_OUTOF10: 5 - MODERATE PAIN
PAINLEVEL_OUTOF10: 2

## 2024-09-06 ASSESSMENT — LIFESTYLE VARIABLES
HAVE PEOPLE ANNOYED YOU BY CRITICIZING YOUR DRINKING: NO
EVER HAD A DRINK FIRST THING IN THE MORNING TO STEADY YOUR NERVES TO GET RID OF A HANGOVER: NO
HAVE YOU EVER FELT YOU SHOULD CUT DOWN ON YOUR DRINKING: NO
EVER FELT BAD OR GUILTY ABOUT YOUR DRINKING: NO
TOTAL SCORE: 0

## 2024-09-06 ASSESSMENT — PAIN DESCRIPTION - DESCRIPTORS: DESCRIPTORS: BURNING

## 2024-09-06 ASSESSMENT — PAIN DESCRIPTION - PAIN TYPE: TYPE: ACUTE PAIN

## 2024-09-06 ASSESSMENT — COLUMBIA-SUICIDE SEVERITY RATING SCALE - C-SSRS
1. IN THE PAST MONTH, HAVE YOU WISHED YOU WERE DEAD OR WISHED YOU COULD GO TO SLEEP AND NOT WAKE UP?: NO
2. HAVE YOU ACTUALLY HAD ANY THOUGHTS OF KILLING YOURSELF?: NO
6. HAVE YOU EVER DONE ANYTHING, STARTED TO DO ANYTHING, OR PREPARED TO DO ANYTHING TO END YOUR LIFE?: NO

## 2024-09-06 ASSESSMENT — PAIN DESCRIPTION - LOCATION: LOCATION: PENIS

## 2024-09-06 ASSESSMENT — PAIN - FUNCTIONAL ASSESSMENT: PAIN_FUNCTIONAL_ASSESSMENT: 0-10

## 2024-09-06 ASSESSMENT — PAIN DESCRIPTION - PROGRESSION: CLINICAL_PROGRESSION: GRADUALLY WORSENING

## 2024-09-06 NOTE — ED PROVIDER NOTES
EMERGENCY DEPARTMENT ENCOUNTER      Pt Name: Kameron Candelaria  MRN: [id number]  Birthdate 1950  Date of evaluation: 9/6/2024  Provider: Sarai Wu DO    CHIEF COMPLAINT       Chief Complaint   Patient presents with    Blood in Urine     Patient has a zarate catheter in place. Patient endorses genital pain/burning and bleeding in catheter bag and from his penis since last night. Zarate is new, placed sometime last week due to urinary retention         HISTORY OF PRESENT ILLNESS    74-year-old male with past medical history of hypertension, diabetes mellitus with peripheral neuropathy, kidney stones, overactive bladder, and benign prostatic hyperplasia, who presents to the ED with blood leaking from his penis and the catheter.  The patient was hospitalized from 9/3-9/4 for bladder outlet obstruction, and he was discharged yesterday with Zarate catheter.  Wife at bedside states that patient was doing very well at home.  However, the patient noticed blood with clots coming out from his penis and urine not draining the Zarate catheter. He has had about 4-5 episodes since last evening.  He also reports having abdominal pain and dysuria.  He denies any fever, chills, fatigue, weakness, lightheadedness, nausea, vomiting, or diarrhea.  He is not on any blood thinners.  His symptoms reminded him of his prior kidney stones in the past.  He denies any surgeries involving the kidney, groin, and his reproductive parts.      History provided by:  Patient and spouse   used: No      Nursing Notes were reviewed.    PAST MEDICAL HISTORY     Past Medical History:   Diagnosis Date    Benign prostatic hyperplasia with lower urinary tract symptoms     Enlarged prostate with lower urinary tract symptoms (LUTS)    Overweight     Overweight    Personal history of other diseases of urinary system     History of hematuria    Personal history of other endocrine, nutritional and metabolic disease     History of morbid obesity  [address] [facility]  Dept: [phone]  Dept Fax: [phone]  Loc: [facility] Personal history of other specified conditions     History of fatigue    Presence of other specified devices 02/07/2022    Indwelling Hull catheter present    Tinea unguium     Onychomycosis of toenail         SURGICAL HISTORY       Past Surgical History:   Procedure Laterality Date    OTHER SURGICAL HISTORY  02/06/2022    Colonoscopy    OTHER SURGICAL HISTORY  01/25/2022    Hand surgery         CURRENT MEDICATIONS       Discharge Medication List as of 9/6/2024  1:53 PM        CONTINUE these medications which have NOT CHANGED    Details   acetaminophen (TylenoL) 325 mg tablet Take 1 tablet (325 mg) by mouth every 8 hours if needed for moderate pain (4 - 6)., Historical Med      amLODIPine (Norvasc) 5 mg tablet Take 1 tablet (5 mg) by mouth once daily., Starting Wed 9/4/2024, Until Fri 10/4/2024, Normal      atorvastatin (Lipitor) 10 mg tablet Take 1 tablet (10 mg) by mouth once daily., Starting Mon 3/4/2024, Normal      glimepiride (Amaryl) 2 mg tablet Take 1 tablet (2 mg) by mouth once daily., Starting Mon 3/4/2024, Normal      lancets 33 gauge misc 1 Lancet once daily. May provide what insurance will cover., Starting Mon 2/19/2024, Normal      metFORMIN (Glucophage) 500 mg tablet Take 2 tablets (1,000 mg) by mouth 2 times a day., Starting Mon 3/4/2024, Normal      OneTouch Ultra Test strip 1 strip by Does not apply route once daily., Starting Mon 2/19/2024, Normal      tamsulosin (Flomax) 0.4 mg 24 hr capsule TAKE 1 CAPSULE (0.4 MG) BY MOUTH ONCE DAILY, Starting Thu 6/13/2024, Until Wed 9/11/2024, Normal             ALLERGIES     Patient has no known allergies.    FAMILY HISTORY       Family History   Problem Relation Name Age of Onset    No Known Problems Mother      No Known Problems Father            SOCIAL HISTORY       Social History     Socioeconomic History    Marital status:    Tobacco Use    Smoking status: Never    Smokeless tobacco: Never   Substance and Sexual Activity    Alcohol use: Yes     Alcohol/week: 2.0 - 3.0 standard drinks of alcohol     Types: 2 - 3 Cans of beer per week    Drug use: Never     Social Determinants of Health     Financial Resource Strain: Patient Declined (9/3/2024)    Overall Financial Resource Strain (CARDIA)     Difficulty of Paying Living Expenses: Patient declined   Transportation Needs: No Transportation Needs (9/3/2024)    PRAPARE - Transportation     Lack of Transportation (Medical): No     Lack of Transportation (Non-Medical): No   Housing Stability: Low Risk  (9/3/2024)    Housing Stability Vital Sign     Unable to Pay for Housing in the Last Year: No     Number of Times Moved in the Last Year: 1     Homeless in the Last Year: No       PHYSICAL EXAM    (up to 7 for level 4, 8 or more for level 5)     ED Triage Vitals [09/06/24 0859]   Temperature Heart Rate Respirations BP   36.2 °C (97.2 °F) 77 18 161/75      Pulse Ox Temp Source Heart Rate Source Patient Position   96 % Temporal Monitor Sitting      BP Location FiO2 (%)     Right arm --       Physical Exam  Vitals and nursing note reviewed. Exam conducted with a chaperone present (JAY Villanueva).   Constitutional:       General: He is not in acute distress.     Appearance: Normal appearance.   HENT:      Head: Normocephalic and atraumatic.   Eyes:      General: No scleral icterus.     Extraocular Movements: Extraocular movements intact.      Conjunctiva/sclera: Conjunctivae normal.   Cardiovascular:      Rate and Rhythm: Normal rate and regular rhythm.      Pulses: Normal pulses.      Heart sounds: Normal heart sounds. No murmur heard.  Pulmonary:      Effort: Pulmonary effort is normal. No respiratory distress.      Breath sounds: Normal breath sounds. No wheezing, rhonchi or rales.   Abdominal:      General: Bowel sounds are normal. There is no distension.      Palpations: Abdomen is soft.      Tenderness: There is abdominal tenderness (Mild, resolved after catheter began draining) in the right lower quadrant,  suprapubic area and left lower quadrant. There is no right CVA tenderness, left CVA tenderness, guarding or rebound.   Genitourinary:     Penis: Tenderness and swelling present. No discharge.       Testes:         Right: Swelling present. Tenderness not present.         Left: Swelling present. Tenderness not present.      Comments: Hull catheter in place with small volume of bloody urine.  Musculoskeletal:         General: No tenderness.      Cervical back: Neck supple.      Right lower leg: No edema.      Left lower leg: No edema.   Skin:     General: Skin is warm and dry.   Neurological:      Mental Status: He is alert and oriented to person, place, and time.   Psychiatric:         Mood and Affect: Mood normal.         Behavior: Behavior normal.          DIAGNOSTIC RESULTS     LABS:  Labs Reviewed   URINALYSIS WITH REFLEX CULTURE AND MICROSCOPIC - Abnormal       Result Value    Color, Urine Brown (*)     Appearance, Urine Turbid (*)     Specific Gravity, Urine 1.009      pH, Urine 7.0      Protein, Urine 50 (1+) (*)     Glucose, Urine Normal      Blood, Urine OVER (3+) (*)     Ketones, Urine NEGATIVE      Bilirubin, Urine NEGATIVE      Urobilinogen, Urine Normal      Nitrite, Urine NEGATIVE      Leukocyte Esterase, Urine 75 Adan/µL (*)     Narrative:     OVER is reported when the result is greater than the clinically reportable range.   MICROSCOPIC ONLY, URINE - Abnormal    WBC, Urine >50 (*)     RBC, Urine >20 (*)     Squamous Epithelial Cells, Urine 1-9 (SPARSE)      Mucus, Urine FEW     URINE CULTURE   URINALYSIS WITH REFLEX CULTURE AND MICROSCOPIC    Narrative:     The following orders were created for panel order Urinalysis with Reflex Culture and Microscopic.  Procedure                               Abnormality         Status                     ---------                               -----------         ------                     Urinalysis with Reflex C...[[id number]]  Abnormal            Final result                Extra Urine Gray Tube[[id number]]                            In process                   Please view results for these tests on the individual orders.   EXTRA URINE GRAY TUBE       All other labs were within normal range or not returned as of this dictation.    Imaging  No orders to display        Procedures  Procedures     EMERGENCY DEPARTMENT COURSE/MDM:     Diagnoses as of 09/06/24 1428   Gross hematuria   Cystitis        Medical Decision Making  This is a 74-year-old male with past medical history of hypertension, diabetes mellitus with peripheral neuropathy, kidney stones, overactive bladder, and benign prostatic hyperplasia, who presented to the ED with blood leaking around the penile area associated with suprapubic pain and dysuria.  He has a Hull catheter placed yesterday due to urinary retention. No fever or chills at home.  He is not on any blood thinners.  In the ED, he is afebrile and hemodynamically stable.  JAY Villanueva was my chaperone during the  exam.  On my exam, he has tenderness palpation of the penile gland and swelling, there is some blood draining around the catheter.   I did notice bloody fluid in the Hull catheter bag and the tubing looks stable.  Hull was flushed and then began draining urine, patient had improvement in his lower abdominal pain.  He is still draining blood-tinge fluid and says that his abdominal pain has remained unchanged though mild.  We obtained bladder scan, and it showed 18 mL of urine indicating a functioning catheter.    Additionally, patient complains of dysuria. We ordered urinalysis to rule out UTI. Urinalysis came back positive for leukocyte estrase 75, WBC >50, and RBCs >20 but negative nitrite.  Urine culture is still pending.  He likely has a acute cystitis.  We will start him on Keflex 500 mg twice daily for 7 days empirically.     No evidence for pyelonephritis, sepsis or septic shock.  He is stable for discharge.  I instructed him to follow-up  with his PCP and urology in the next 1 to 2 days.  I advised him to return to the ER if he has worsening symptoms (abdominal pain, persisting bleeding in the urine, severe nausea and vomiting, back pain, new onset confusion, fever >103F) or any other concerning symptoms.    Amount and/or Complexity of Data Reviewed  Labs: ordered.      Patient and or family in agreement and understanding of treatment plan.  All questions answered.      I reviewed the case with the attending ED physician. The attending ED physician agrees with the plan. Patient and/or patient´s representative was counseled regarding labs, imaging, likely diagnosis, and plan. All questions were answered.    ED Medications administered this visit:  Medications - No data to display    New Prescriptions from this visit:    Discharge Medication List as of 9/6/2024  1:53 PM        START taking these medications    Details   cephalexin (Keflex) 500 mg capsule Take 1 capsule (500 mg) by mouth 2 times a day for 7 days., Starting Fri 9/6/2024, Until Fri 9/13/2024, Normal             Follow-up:  Sebastian Connelly MD  [address] [place] [address] [address]  [facility] [address] [phone]    Call in 1 day          Final Impression:   1. Gross hematuria    2. Cystitis          (Please note that portions of this note were completed with a voice recognition program.  Efforts were made to edit the dictations but occasionally words are mis-transcribed.)     Sarai Wu DO  Resident  09/06/24 9639    The patient was seen by the resident/fellow.  I have personally performed a substantive portion of the encounter.  I have seen and examined the patient; agree with the workup, evaluation, MDM, management and diagnosis.  The care plan has been discussed with the resident; I have reviewed the resident’s note and agree with the documented findings.                                   Sánchez Irene DO  09/09/24 4072

## 2024-09-06 NOTE — DISCHARGE INSTRUCTIONS
Please call your primary care provider Dr. Connelly and schedule a follow up appointment in the next 1-2 days.  Please take Keflex 500 mg twice a day for 7 days to treat the inflammation in your bladder.    If you have worsening symptoms of abdominal pain, persisting bleeding in the urine, severe nausea and vomiting, back pain, new onset confusion, fever >103F, or any concerning symptoms, please return to ER.    Thank you for allowing us to participate in your health care.    -[facility].

## 2024-09-07 LAB — BACTERIA UR CULT: NO GROWTH

## 2024-09-09 ENCOUNTER — APPOINTMENT (OUTPATIENT)
Dept: PRIMARY CARE | Facility: CLINIC | Age: 74
End: 2024-09-09
Payer: COMMERCIAL

## 2024-09-09 VITALS
BODY MASS INDEX: 39.73 KG/M2 | HEIGHT: 66 IN | WEIGHT: 247.2 LBS | DIASTOLIC BLOOD PRESSURE: 74 MMHG | SYSTOLIC BLOOD PRESSURE: 122 MMHG | HEART RATE: 84 BPM

## 2024-09-09 DIAGNOSIS — E11.9 TYPE 2 DIABETES MELLITUS WITHOUT COMPLICATION, WITHOUT LONG-TERM CURRENT USE OF INSULIN (MULTI): ICD-10-CM

## 2024-09-09 DIAGNOSIS — R33.9 URINE RETENTION: ICD-10-CM

## 2024-09-09 DIAGNOSIS — Z97.8 FOLEY CATHETER IN PLACE: ICD-10-CM

## 2024-09-09 DIAGNOSIS — E78.5 HYPERLIPIDEMIA, UNSPECIFIED HYPERLIPIDEMIA TYPE: ICD-10-CM

## 2024-09-09 DIAGNOSIS — Z23 NEED FOR INFLUENZA VACCINATION: ICD-10-CM

## 2024-09-09 DIAGNOSIS — I10 PRIMARY HYPERTENSION: ICD-10-CM

## 2024-09-09 DIAGNOSIS — Z09 HOSPITAL DISCHARGE FOLLOW-UP: Primary | ICD-10-CM

## 2024-09-09 PROCEDURE — 3008F BODY MASS INDEX DOCD: CPT | Performed by: INTERNAL MEDICINE

## 2024-09-09 PROCEDURE — 3074F SYST BP LT 130 MM HG: CPT | Performed by: INTERNAL MEDICINE

## 2024-09-09 PROCEDURE — 1159F MED LIST DOCD IN RCRD: CPT | Performed by: INTERNAL MEDICINE

## 2024-09-09 PROCEDURE — 99496 TRANSJ CARE MGMT HIGH F2F 7D: CPT | Performed by: INTERNAL MEDICINE

## 2024-09-09 PROCEDURE — 3051F HG A1C>EQUAL 7.0%<8.0%: CPT | Performed by: INTERNAL MEDICINE

## 2024-09-09 PROCEDURE — 1158F ADVNC CARE PLAN TLK DOCD: CPT | Performed by: INTERNAL MEDICINE

## 2024-09-09 PROCEDURE — 3048F LDL-C <100 MG/DL: CPT | Performed by: INTERNAL MEDICINE

## 2024-09-09 PROCEDURE — 1123F ACP DISCUSS/DSCN MKR DOCD: CPT | Performed by: INTERNAL MEDICINE

## 2024-09-09 PROCEDURE — 1036F TOBACCO NON-USER: CPT | Performed by: INTERNAL MEDICINE

## 2024-09-09 PROCEDURE — 3078F DIAST BP <80 MM HG: CPT | Performed by: INTERNAL MEDICINE

## 2024-09-09 ASSESSMENT — PATIENT HEALTH QUESTIONNAIRE - PHQ9
2. FEELING DOWN, DEPRESSED OR HOPELESS: NOT AT ALL
1. LITTLE INTEREST OR PLEASURE IN DOING THINGS: NOT AT ALL
SUM OF ALL RESPONSES TO PHQ9 QUESTIONS 1 AND 2: 0

## 2024-09-09 NOTE — PROGRESS NOTES
Assessment/Plan   Problem List Items Addressed This Visit       Diabetes mellitus (Multi)    Hyperlipidemia    Hypertension    Urine retention    Need for influenza vaccination    Hospital discharge follow-up - Primary    Hull catheter in place     We discussed the care current medications advised to follow-up with urologist because the prostatic enlargement has been the main problem causing urinary retention and Hull catheter in place  Regarding other conditions we reviewed the current medications  Advised to follow-up in 4 to 6 weeks time  Subjective   Patient ID: Kameron Candelaria is a 74 y.o. male who presents for Follow-up (TCM.  Patient was in the ER for bladder blockage 9/6/2024.  Was in hospital for the same issue as well.).    Past Surgical History:   Procedure Laterality Date    OTHER SURGICAL HISTORY  02/06/2022    Colonoscopy    OTHER SURGICAL HISTORY  01/25/2022    Hand surgery      Family History   Problem Relation Name Age of Onset    No Known Problems Mother      No Known Problems Father        Social History     Socioeconomic History    Marital status:      Spouse name: Not on file    Number of children: Not on file    Years of education: Not on file    Highest education level: Not on file   Occupational History    Not on file   Tobacco Use    Smoking status: Never    Smokeless tobacco: Never   Substance and Sexual Activity    Alcohol use: Yes     Alcohol/week: 2.0 - 3.0 standard drinks of alcohol     Types: 2 - 3 Cans of beer per week    Drug use: Never    Sexual activity: Not on file   Other Topics Concern    Not on file   Social History Narrative    Not on file     Social Determinants of Health     Financial Resource Strain: Patient Declined (9/3/2024)    Overall Financial Resource Strain (CARDIA)     Difficulty of Paying Living Expenses: Patient declined   Food Insecurity: Not on file   Transportation Needs: No Transportation Needs (9/3/2024)    PRAPARE - Transportation     Lack of  "Transportation (Medical): No     Lack of Transportation (Non-Medical): No   Physical Activity: Not on file   Stress: Not on file   Social Connections: Not on file   Intimate Partner Violence: Not on file   Housing Stability: Low Risk  (9/3/2024)    Housing Stability Vital Sign     Unable to Pay for Housing in the Last Year: No     Number of Times Moved in the Last Year: 1     Homeless in the Last Year: No      Patient has no known allergies.   Current Outpatient Medications   Medication Sig Dispense Refill    acetaminophen (TylenoL) 325 mg tablet Take 1 tablet (325 mg) by mouth every 8 hours if needed for moderate pain (4 - 6).      amLODIPine (Norvasc) 5 mg tablet Take 1 tablet (5 mg) by mouth once daily. 30 tablet 0    atorvastatin (Lipitor) 10 mg tablet Take 1 tablet (10 mg) by mouth once daily. 90 tablet 1    cephalexin (Keflex) 500 mg capsule Take 1 capsule (500 mg) by mouth 2 times a day for 7 days. 14 capsule 0    glimepiride (Amaryl) 2 mg tablet Take 1 tablet (2 mg) by mouth once daily. 90 tablet 1    lancets 33 gauge misc 1 Lancet once daily. May provide what insurance will cover. 100 each 1    metFORMIN (Glucophage) 500 mg tablet Take 2 tablets (1,000 mg) by mouth 2 times a day. 180 tablet 1    OneTouch Ultra Test strip 1 strip by Does not apply route once daily. 100 each 1    tamsulosin (Flomax) 0.4 mg 24 hr capsule TAKE 1 CAPSULE (0.4 MG) BY MOUTH ONCE DAILY (Patient taking differently: Take 1 capsule (0.4 mg) by mouth once daily at bedtime.) 90 capsule 0     No current facility-administered medications for this visit.      Vitals:    09/09/24 1116   BP: 122/74   BP Location: Left arm   Patient Position: Sitting   Pulse: 84   Weight: 112 kg (247 lb 3.2 oz)   Height: 1.676 m (5' 6\")      Problem List Items Addressed This Visit       Diabetes mellitus (Multi)    Hyperlipidemia    Hypertension    Urine retention    Need for influenza vaccination    Hospital discharge follow-up - Primary    Hull catheter in " "place      No orders of the defined types were placed in this encounter.       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  After discharge he had to go back to the ED because there was some blood coming out and he has been given the antibiotic he feels okay but he wanted to get it fixed and he had problem getting an appointment sooner eventually he got an appointment on 12th of this month that means not far away but the urologist may call sooner and he is looking forward to see him even before that    ROS  Continues to have Hull catheter in with some pericatheter leak  And some discomfort no fever no nausea or vomiting  Past medical history reviewed  Social and family history reviewed  Allergies and medications reviewed  Recent labs reviewed  Vital signs reviewed    PHYSICAL EXAM  Heart sounds regular chest clear abdomen soft nontender neuro awake alert  Hospital information Hospital investigations discharge summary and all the information's reviewed including follow-up ED visit      No results found for: \"PR1\", \"BMPR1A\", \"CMPLAS\", \"GB8GHOAM\", \"KPSAT\"   Lab Results   Component Value Date    CHOL 132 07/09/2024    LDLCALC 56 07/09/2024    CHHDL 2.8 07/09/2024                "

## 2024-09-12 ENCOUNTER — APPOINTMENT (OUTPATIENT)
Dept: UROLOGY | Facility: HOSPITAL | Age: 74
End: 2024-09-12
Payer: COMMERCIAL

## 2024-09-12 DIAGNOSIS — N13.8 BPH WITH URINARY OBSTRUCTION: Primary | ICD-10-CM

## 2024-09-12 DIAGNOSIS — N40.1 BENIGN PROSTATIC HYPERPLASIA WITH URINARY RETENTION: Primary | ICD-10-CM

## 2024-09-12 DIAGNOSIS — E11.9 TYPE 2 DIABETES MELLITUS WITHOUT COMPLICATION, WITHOUT LONG-TERM CURRENT USE OF INSULIN (MULTI): Primary | ICD-10-CM

## 2024-09-12 DIAGNOSIS — N40.1 BPH WITH URINARY OBSTRUCTION: Primary | ICD-10-CM

## 2024-09-12 DIAGNOSIS — R79.1 ABNORMAL COAGULATION PROFILE: ICD-10-CM

## 2024-09-12 DIAGNOSIS — R33.8 BENIGN PROSTATIC HYPERPLASIA WITH URINARY RETENTION: Primary | ICD-10-CM

## 2024-09-12 DIAGNOSIS — R31.0 GROSS HEMATURIA: ICD-10-CM

## 2024-09-12 PROCEDURE — 1123F ACP DISCUSS/DSCN MKR DOCD: CPT | Performed by: UROLOGY

## 2024-09-12 PROCEDURE — 99214 OFFICE O/P EST MOD 30 MIN: CPT | Performed by: UROLOGY

## 2024-09-12 PROCEDURE — 3051F HG A1C>EQUAL 7.0%<8.0%: CPT | Performed by: UROLOGY

## 2024-09-12 PROCEDURE — 3048F LDL-C <100 MG/DL: CPT | Performed by: UROLOGY

## 2024-09-12 RX ORDER — SODIUM CHLORIDE, SODIUM LACTATE, POTASSIUM CHLORIDE, CALCIUM CHLORIDE 600; 310; 30; 20 MG/100ML; MG/100ML; MG/100ML; MG/100ML
20 INJECTION, SOLUTION INTRAVENOUS CONTINUOUS
OUTPATIENT
Start: 2024-09-12

## 2024-09-12 RX ORDER — ACETAMINOPHEN 325 MG/1
975 TABLET ORAL ONCE
OUTPATIENT
Start: 2024-09-12 | End: 2024-09-12

## 2024-09-12 NOTE — PROGRESS NOTES
HPI    74 y.o. male being seen with the following problem list:    Problem list:  Massive prostatomegaly, urinary retention, hematuria    09/12/24 - seen in consult. 346g prostate on prior MRI. Admitted briefly at [facility] 9/3-9/4 for urinary retention, difficult zarate placement. Re-presented to ED 9/6 for hematuria. Seen today for consideration of outlet surgery    Lab Results   Component Value Date    PSA 9.74 (H) 09/03/2024    PSA 8.6 (H) 11/28/2023    PSA 4.95 (H) 05/22/2023    PSA 4.69 (H) 08/08/2022    PSA 12.6 (H) 01/26/2022         Current Medications:  Current Outpatient Medications   Medication Sig Dispense Refill    acetaminophen (TylenoL) 325 mg tablet Take 1 tablet (325 mg) by mouth every 8 hours if needed for moderate pain (4 - 6).      amLODIPine (Norvasc) 5 mg tablet Take 1 tablet (5 mg) by mouth once daily. 30 tablet 0    atorvastatin (Lipitor) 10 mg tablet Take 1 tablet (10 mg) by mouth once daily. 90 tablet 1    cephalexin (Keflex) 500 mg capsule Take 1 capsule (500 mg) by mouth 2 times a day for 7 days. 14 capsule 0    glimepiride (Amaryl) 2 mg tablet Take 1 tablet (2 mg) by mouth once daily. 90 tablet 1    lancets 33 gauge misc 1 Lancet once daily. May provide what insurance will cover. 100 each 1    metFORMIN (Glucophage) 500 mg tablet Take 2 tablets (1,000 mg) by mouth 2 times a day. 180 tablet 1    OneTouch Ultra Test strip 1 strip by Does not apply route once daily. 100 each 1     No current facility-administered medications for this visit.        Active Problems:  Kameron Candelaria is a 74 y.o. male with the following Problems and Medications.  Patient Active Problem List   Diagnosis    Anemia    Atrophic gastritis    BPH with obstruction/lower urinary tract symptoms    Diabetes mellitus (Multi)    Dupuytren contracture    Elevated PSA    GERD without esophagitis    Gross hematuria    Hyperlipidemia    Hypertension    OAB (overactive bladder)    PAD (peripheral artery disease) (CMS-[facility])     Peripheral neuropathy    Pleurodynia    Straining to void    Urine retention    Obesity, morbid (Multi)    Well adult health check    BMI 38.0-38.9,adult    History of obesity    Onychomycosis of toenail    Overweight    Bladder outlet obstruction    Hematuria    Need for influenza vaccination    Hospital discharge follow-up    Hull catheter in place     Current Outpatient Medications   Medication Sig Dispense Refill    acetaminophen (TylenoL) 325 mg tablet Take 1 tablet (325 mg) by mouth every 8 hours if needed for moderate pain (4 - 6).      amLODIPine (Norvasc) 5 mg tablet Take 1 tablet (5 mg) by mouth once daily. 30 tablet 0    atorvastatin (Lipitor) 10 mg tablet Take 1 tablet (10 mg) by mouth once daily. 90 tablet 1    cephalexin (Keflex) 500 mg capsule Take 1 capsule (500 mg) by mouth 2 times a day for 7 days. 14 capsule 0    glimepiride (Amaryl) 2 mg tablet Take 1 tablet (2 mg) by mouth once daily. 90 tablet 1    lancets 33 gauge misc 1 Lancet once daily. May provide what insurance will cover. 100 each 1    metFORMIN (Glucophage) 500 mg tablet Take 2 tablets (1,000 mg) by mouth 2 times a day. 180 tablet 1    OneTouch Ultra Test strip 1 strip by Does not apply route once daily. 100 each 1     No current facility-administered medications for this visit.       PMH:  Past Medical History:   Diagnosis Date    Benign prostatic hyperplasia with lower urinary tract symptoms     Enlarged prostate with lower urinary tract symptoms (LUTS)    Overweight     Overweight    Personal history of other diseases of urinary system     History of hematuria    Personal history of other endocrine, nutritional and metabolic disease     History of morbid obesity    Personal history of other specified conditions     History of fatigue    Presence of other specified devices 02/07/2022    Indwelling Hull catheter present    Tinea unguium     Onychomycosis of toenail       PSH:  Past Surgical History:   Procedure Laterality Date     OTHER SURGICAL HISTORY  02/06/2022    Colonoscopy    OTHER SURGICAL HISTORY  01/25/2022    Hand surgery       FMH:  Family History   Problem Relation Name Age of Onset    No Known Problems Mother      No Known Problems Father         SHx:  Social History     Tobacco Use    Smoking status: Never    Smokeless tobacco: Never   Substance Use Topics    Alcohol use: Yes     Alcohol/week: 2.0 - 3.0 standard drinks of alcohol     Types: 2 - 3 Cans of beer per week    Drug use: Never       Allergies:  No Known Allergies    Assessment/Plan  Massive prostatomegaly, now with retention and hematuria.  He is in need of an outlet procedure.  The only 2 relevant options are HoLEP versus robotic simple prostatectomy.  Discussed the HoLEP is technically feasible but presents unique challenges at this size of prostate enlargement.  Given my schedule as well as the size of his prostate, will instead refer him for robotic simple prostatectomy.  I have talked with Dr. Mccarty and Dr. Carroll about options for surgical dates.  I would back with the patient and his daughter Shyanne ([phone]) ideally later today with a plan.          Scribe Attestation  By signing my name below, I, Cherie Balderas, attest that this documentation  has been prepared under the direction and in the presence of Thomas Villar MD.

## 2024-09-15 LAB
ATRIAL RATE: 83 BPM
P AXIS: 52 DEGREES
P OFFSET: 199 MS
P ONSET: 147 MS
PR INTERVAL: 164 MS
Q ONSET: 229 MS
QRS COUNT: 14 BEATS
QRS DURATION: 64 MS
QT INTERVAL: 350 MS
QTC CALCULATION(BAZETT): 411 MS
QTC FREDERICIA: 390 MS
R AXIS: 38 DEGREES
T AXIS: 32 DEGREES
T OFFSET: 404 MS
VENTRICULAR RATE: 83 BPM

## 2024-09-16 ENCOUNTER — PRE-ADMISSION TESTING (OUTPATIENT)
Dept: PREADMISSION TESTING | Facility: HOSPITAL | Age: 74
End: 2024-09-16
Payer: COMMERCIAL

## 2024-09-16 ENCOUNTER — LAB (OUTPATIENT)
Dept: LAB | Facility: LAB | Age: 74
End: 2024-09-16
Payer: COMMERCIAL

## 2024-09-16 VITALS
SYSTOLIC BLOOD PRESSURE: 118 MMHG | RESPIRATION RATE: 16 BRPM | WEIGHT: 239.2 LBS | TEMPERATURE: 97.2 F | DIASTOLIC BLOOD PRESSURE: 66 MMHG | BODY MASS INDEX: 38.44 KG/M2 | OXYGEN SATURATION: 97 % | HEART RATE: 89 BPM | HEIGHT: 66 IN

## 2024-09-16 DIAGNOSIS — R79.1 ABNORMAL COAGULATION PROFILE: ICD-10-CM

## 2024-09-16 DIAGNOSIS — N13.8 BPH WITH URINARY OBSTRUCTION: ICD-10-CM

## 2024-09-16 DIAGNOSIS — Z01.818 PRE-OP TESTING: Primary | ICD-10-CM

## 2024-09-16 DIAGNOSIS — N40.1 BPH WITH URINARY OBSTRUCTION: ICD-10-CM

## 2024-09-16 LAB
ABO GROUP (TYPE) IN BLOOD: NORMAL
ANTIBODY SCREEN: NORMAL
APTT PPP: 34 SECONDS (ref 27–38)
INR PPP: 1.2 (ref 0.9–1.1)
PROTHROMBIN TIME: 13.3 SECONDS (ref 9.8–12.8)
RH FACTOR (ANTIGEN D): NORMAL

## 2024-09-16 PROCEDURE — 86900 BLOOD TYPING SEROLOGIC ABO: CPT

## 2024-09-16 PROCEDURE — 85730 THROMBOPLASTIN TIME PARTIAL: CPT

## 2024-09-16 PROCEDURE — 36415 COLL VENOUS BLD VENIPUNCTURE: CPT

## 2024-09-16 PROCEDURE — 86901 BLOOD TYPING SEROLOGIC RH(D): CPT

## 2024-09-16 PROCEDURE — 85610 PROTHROMBIN TIME: CPT

## 2024-09-16 PROCEDURE — 86850 RBC ANTIBODY SCREEN: CPT

## 2024-09-16 ASSESSMENT — ACTIVITIES OF DAILY LIVING (ADL): ADL_SCORE: 0

## 2024-09-16 ASSESSMENT — DUKE ACTIVITY SCORE INDEX (DASI)
CAN YOU PARTICIPATE IN MODERATE RECREATIONAL ACTIVITIES LIKE GOLF, BOWLING, DANCING, DOUBLES TENNIS OR THROWING A BASEBALL OR FOOTBALL: NO
CAN YOU DO YARD WORK LIKE RAKING LEAVES, WEEDING OR PUSHING A MOWER: YES
CAN YOU WALK A BLOCK OR TWO ON LEVEL GROUND: YES
TOTAL_SCORE: 23.45
CAN YOU DO LIGHT WORK AROUND THE HOUSE LIKE DUSTING OR WASHING DISHES: YES
CAN YOU PARTICIPATE IN STRENOUS SPORTS LIKE SWIMMING, SINGLES TENNIS, FOOTBALL, BASKETBALL, OR SKIING: NO
CAN YOU DO HEAVY WORK AROUND THE HOUSE LIKE SCRUBBING FLOORS OR LIFTING AND MOVING HEAVY FURNITURE: NO
DASI METS SCORE: 5.6
CAN YOU WALK INDOORS, SUCH AS AROUND YOUR HOUSE: YES
CAN YOU DO MODERATE WORK AROUND THE HOUSE LIKE VACUUMING, SWEEPING FLOORS OR CARRYING GROCERIES: YES
CAN YOU HAVE SEXUAL RELATIONS: NO
CAN YOU RUN A SHORT DISTANCE: NO
CAN YOU TAKE CARE OF YOURSELF (EAT, DRESS, BATHE, OR USE TOILET): YES
CAN YOU CLIMB A FLIGHT OF STAIRS OR WALK UP A HILL: YES

## 2024-09-16 ASSESSMENT — PAIN - FUNCTIONAL ASSESSMENT: PAIN_FUNCTIONAL_ASSESSMENT: 0-10

## 2024-09-16 ASSESSMENT — LIFESTYLE VARIABLES: SMOKING_STATUS: NONSMOKER

## 2024-09-16 ASSESSMENT — PAIN SCALES - GENERAL: PAINLEVEL_OUTOF10: 0 - NO PAIN

## 2024-09-16 NOTE — PREPROCEDURE INSTRUCTIONS
Medication List            Accurate as of September 16, 2024 11:42 AM. Always use your most recent med list.                amLODIPine 5 mg tablet  Commonly known as: Norvasc  Take 1 tablet (5 mg) by mouth once daily.  Medication Adjustments for Surgery: Take/Use as prescribed     atorvastatin 10 mg tablet  Commonly known as: Lipitor  Take 1 tablet (10 mg) by mouth once daily.  Medication Adjustments for Surgery: Take/Use as prescribed     glimepiride 2 mg tablet  Commonly known as: Amaryl  Take 1 tablet (2 mg) by mouth once daily.  Additional Medication Adjustments for Surgery: Other (Comment)  Notes to patient: HOLD any evening dose the night before the day of surgery  HOLD the day of surgery     lancets 33 gauge misc  1 Lancet once daily. May provide what insurance will cover.     metFORMIN 500 mg tablet  Commonly known as: Glucophage  Take 2 tablets (1,000 mg) by mouth 2 times a day.  Additional Medication Adjustments for Surgery: Other (Comment)  Notes to patient: HOLD any evening dose the night before the day of surgery  HOLD the day of surgery     OneTouch Ultra Test strip  Generic drug: blood sugar diagnostic  1 strip by Does not apply route once daily.     tamsulosin 0.4 mg 24 hr capsule  Commonly known as: Flomax  TAKE 1 CAPSULE (0.4 MG) BY MOUTH ONCE DAILY  Additional Medication Adjustments for Surgery: Other (Comment)  Notes to patient: Not taking     TylenoL 325 mg tablet  Generic drug: acetaminophen  Additional Medication Adjustments for Surgery: Other (Comment)  Notes to patient: May use the morning of surgery if needed    STOP all NSAIDS, over the counter medications, herbals and supplements for 7 days prior to surgery                          PRE-OPERATIVE INSTRUCTIONS    You will receive notification one business day prior to your procedure to confirm your arrival time. It is important that you answer your phone and/or check your messages during this time. If you do not hear from the surgery  center by 5 pm. the day before your procedure, please call [phone].     Please enter the building through the Outpatient entrance and take the elevator off the lobby to the 2nd floor then check in at the Outpatient Surgery desk on the 2nd floor.    INSTRUCTIONS:  Talk to your surgeon for instructions if you should stop your aspirin, blood thinner, or diabetes medicines.  DO NOT take any multivitamins or over the counter supplements for 7-10 days before surgery.  If not being admitted, you must have an adult immediately available to drive you home after surgery. We also highly recommend you have someone stay with you for the entire day and night of your surgery.  For children having surgery, a parent or legal guardian must accompany them to the surgery center. If this is not possible, please call [phone] to make additional arrangements.  For adults who are unable to consent or make medical decisions for themselves, a legal guardian or Power of  must accompany them to the surgery center. If this is not possible, please call [phone] to make additional arrangements.  Wear comfortable, loose fitting clothing.  All jewelry and piercings must be removed. If you are unable to remove an item or have a dermal piercing, please be sure to tell the nurse when you arrive for surgery.  Nail polish and make-up must be removed.  Avoid smoking or consuming alcohol for 24 hours before surgery.  To help prevent infection, please take a shower/bath and wash your hair the night before and/or morning of surgery (or follow other specific bathing instructions provided).    Preoperative Fasting Guidelines    Why must I stop eating and drinking near surgery time?  With sedation, food or liquid in your stomach can enter your lungs causing serious complications  Increases nausea and vomiting    When do I need to stop eating and drinking before my surgery?  Do not eat any solid food after midnight the night before your  surgery/procedure unless otherwise instructed by your surgeon.   You may have up to 13.5 ounces of clear liquid until TWO hours before your instructed arrival time to the hospital.  This includes water, black tea/coffee, (no milk or cream) apple juice, and electrolyte drinks (Gatorade).   You may chew gum until TWO hours before your surgery/procedure      If applicable, notify your surgeons office immediately of any new skin changes that occur to the surgical limb.      If you have any questions or concerns, please call Pre-Admission Testing at [phone].             Home Preoperative Antibacterial Shower with Chlorhexidine gluconate (CHG)     What is a home preoperative antibacterial shower?  This shower is a way of cleaning the skin with a germ killing solution before surgery. The solution contains chlorhexidine gluconate, commonly known as CHG. CHG is a skin cleanser with germ killing ability. Let your doctor know if you are allergic to chlorhexidine.    Why do I need to take a preoperative antibacterial shower?  Skin is not sterile. It is best to try to make your skin as free of germs as possible before surgery. Proper cleansing with a germ killing soap before surgery can lower the number of germs on your skin. This helps to reduce the risk of infection at the surgical site. Following the instructions listed below will help you prepare your skin for surgery.    How do I use the solution?  Begin using your CHG soap the night before and again the morning of your procedure.   Do not shave the day before or day of surgery.  Remove all jewelry until after surgery. Take off rings and take out all body-piercing jewelry.  Wash your face and hair with normal soap and shampoo before you use the CHG soap.  Apply the CHG solution to a clean wet washcloth. Move away from the water to avoid premature rinsing of the CHG soap as you are applying. Firmly lather your entire body from the neck down. Do not use CHG on your  face, eyes, ears, or genitals.   Pay special attention to the area where your incisions will be located.  Do not scrub your skin too hard.  It is important to allow the CHG soap to sit on your skin for 3-5 minutes.  Rinse the solution off your body completely. Do not wash with your normal soap after the CHG soap solution.  Pat yourself dry with a clean, soft towel.  Do not apply powders, lotions or deodorants as these might block how the CHG soap works.   Dress in clean clothing.  Be sure to sleep with clean, freshly laundered sheets.  Be aware that CHG can cause stains on fabric. Rinse your washcloth and other linens that have contact with CHG completely. Use only non-chlorine detergents to launder the items used.

## 2024-09-16 NOTE — PREPROCEDURE INSTRUCTIONS
Thank you for visiting Preadmission Testing at [facility]. If you have any changes to your health condition, please call the SURGEON's office to alert them and give them details of your symptoms.        Preoperative Brain Exercises    What are brain exercises?  A brain exercise is any activity that engages your thinking (cognitive) skills.    What types of activities are considered brain exercises?  Jigsaw puzzles, crossword puzzles, word jumble, memory games, word search, and many more.  Many can be found free online or on your phone via a mobile elie.    Why should I do brain exercises before my surgery?  More recent research has shown brain exercise before surgery can lower the risk of postoperative delirium (confusion) which can be especially important for older adults.  Patients who did brain exercises for 5 to 10 hours the days before surgery, cut their risk of postoperative delirium in half up to 1 week after surgery.      Preoperative Deep Breathing Exercises    Why it is important to do deep breathing exercises before my surgery?  Deep breathing exercises strengthen your breathing muscles.  This helps you to recover after your surgery and decreases the chance of breathing complications.    How are the deep breathing exercises done?  Sit straight with your back supported.  Breathe in deeply and slowly through your nose. Your lower rib cage should expand and your abdomen may move forward.  Hold that breath for 3 to 5 seconds.  Breathe out through pursed lips, slowly and completely.  Rest and repeat 10 times every hour while awake.  Rest longer if you become dizzy or lightheaded.      Patient and Family Education   Ways You Can Help Prevent Blood Clots     This handout explains some simple things you can do to help prevent blood clots.      Blood clots are blockages that can form in the body's veins. When a blood clot forms in your deep veins, it may be called a deep vein thrombosis, or DVT for short. Blood  clots can happen in any part of the body where blood flows, but they are most common in the arms and legs. If a piece of a blood clot breaks free and travels to the lungs, it is called a pulmonary embolus (PE). A PE can be a very serious problem.      Being in the hospital or having surgery can raise your chances of getting a blood clot because you may not be well enough to move around as much as you normally do.      Ways you can help prevent blood clots in the hospital         Wearing SCDs. SCDs stands for Sequential Compression Devices.   SCDs are special sleeves that wrap around your legs  They attach to a pump that fills them with air to gently squeeze your legs every few minutes.   This helps return the blood in your legs to your heart.   SCDs should only be taken off when walking or bathing.   SCDs may not be comfortable, but they can help save your life.               Wearing compression stockings - if your doctor orders them. These special snug fitting stockings gently squeeze your legs to help blood flow.       Walking. Walking helps move the blood in your legs.   If your doctor says it is ok, try walking the halls at least   5 times a day. Ask us to help you get up, so you don't fall.      Taking any blood thinning medicines your doctor orders.          ©[employer]; 3/23        Ways you can help prevent blood clots at home       Wearing compression stockings - if your doctor orders them. ? Walking - to help move the blood in your legs.       Taking any blood thinning medicines your doctor orders.      Signs of a blood clot or PE      Tell your doctor or nurse know right away if you have of the problems listed below.    If you are at home, seek medical care right away. Call 911 for chest pain or problems breathing.          Signs of a blood clot (DVT) - such as pain,  swelling, redness or warmth in your arm or leg      Signs of a pulmonary embolism (PE) - such as chest     pain or feeling short  of breath

## 2024-09-16 NOTE — CPM/PAT H&P
CPM/PAT Evaluation       Name: Kameron Candelaria (Kameron Candelaria)  /Age: 1950/74 y.o.     In-Person       Chief Complaint: enlarged prostate with urine retention    HPI    GF is a 73 yo male who has been under PCP and urology care for enlarged prostate with LUTS, requiring an indwelling zarate catheter recently. He has had some recent obstruction issues with the catheter and presented to ED for exchange. He is now scheduled for a robotic simple prostatectomy. Presents to CPM today for perioperative risk stratification and optimization. He continues to have the catheter in place with leg collection bag. He did have a recent UTI and treated with antibiotics.  Most recent urine culture from 2024 showed no growth. Otherwise denies any recent illness, fever/chills, chest pains or shortness of breath.      Past Medical History:   Diagnosis Date    Benign prostatic hyperplasia with lower urinary tract symptoms     Enlarged prostate with lower urinary tract symptoms (LUTS)    GERD (gastroesophageal reflux disease)     Overweight     Overweight    Personal history of other diseases of urinary system     History of hematuria    Personal history of other endocrine, nutritional and metabolic disease     History of morbid obesity    Personal history of other specified conditions     History of fatigue    Presence of other specified devices 2022    Indwelling Zarate catheter present    Tinea unguium     Onychomycosis of toenail    Type 2 diabetes mellitus (Multi)      PCP: Dr. Connelly    Lab Results   Component Value Date    WBC 9.2 2024    HGB 12.7 (L) 2024    HCT 38.7 (L) 2024    MCV 86 2024     2024     Lab Results   Component Value Date    GLUCOSE 152 (H) 2024    CALCIUM 8.6 2024     2024    K 4.1 2024    CO2 27 2024     2024    BUN 15 2024    CREATININE 0.92 2024       Hemoglobin A1C   Date Value Ref Range Status    07/09/2024 7.0 (H) see below % Final      Encounter Date: 09/03/24   ECG 12 lead   Result Value    Ventricular Rate 83    Atrial Rate 83    HI Interval 164    QRS Duration 64    QT Interval 350    QTC Calculation(Bazett) 411    P Axis 52    R Axis 38    T Axis 32    QRS Count 14    Q Onset 229    P Onset 147    P Offset 199    T Offset 404    QTC Fredericia 390    Narrative    Normal sinus rhythm  Poor R-wave progression  Otherwise normal ECG  No previous ECGs available  Confirmed by Mahamed Trimble ([id number]) on 9/15/2024 9:41:30 PM         Past Surgical History:   Procedure Laterality Date    OTHER SURGICAL HISTORY  02/06/2022    Colonoscopy    OTHER SURGICAL HISTORY  01/25/2022    Hand surgery       Patient  reports that he is not currently sexually active.    Family History   Problem Relation Name Age of Onset    Diabetes Mother      No Known Problems Father         No Known Allergies    Prior to Admission medications    Medication Sig Start Date End Date Taking? Authorizing Provider   acetaminophen (TylenoL) 325 mg tablet Take 2 tablets (650 mg) by mouth every 8 hours if needed for moderate pain (4 - 6).    Historical Provider, MD   amLODIPine (Norvasc) 5 mg tablet Take 1 tablet (5 mg) by mouth once daily. 9/4/24 10/4/24  Soren Villa MD   atorvastatin (Lipitor) 10 mg tablet Take 1 tablet (10 mg) by mouth once daily. 3/4/24   Sebastian Connelly MD   cephalexin (Keflex) 500 mg capsule Take 1 capsule (500 mg) by mouth 2 times a day for 7 days.  Patient not taking: Reported on 9/16/2024 9/6/24 9/13/24  Sarai Wu DO   glimepiride (Amaryl) 2 mg tablet Take 1 tablet (2 mg) by mouth once daily. 3/4/24   Sebastian Connelly MD   lancets 33 gauge misc 1 Lancet once daily. May provide what insurance will cover. 2/19/24   Sebastian Connelly MD   metFORMIN (Glucophage) 500 mg tablet Take 2 tablets (1,000 mg) by mouth 2 times a day. 3/4/24   Sebastian Connelly MD   OneTouch Ultra Test strip 1 strip by Does not apply route once  daily. 2/19/24   Sebastian Connelly MD   tamsulosin (Flomax) 0.4 mg 24 hr capsule TAKE 1 CAPSULE (0.4 MG) BY MOUTH ONCE DAILY  Patient taking differently: Take 1 capsule (0.4 mg) by mouth once daily at bedtime. 6/13/24   Sebastian Connelly MD        PAT ROS   Constitutional: Negative for fever, chills, or sweats   ENMT: Negative for nasal discharge, congestion, ear pain, mouth pain, throat pain   Respiratory: Negative for cough, wheezing, shortness of breath   Cardiac: Negative for chest pain, dyspnea on exertion, palpitations   Gastrointestinal: Negative for nausea, vomiting, diarrhea, constipation, abdominal pain    Genitourinary:   Indwelling urinary catheter in place, with burning at urethra tip at times and occasional hematuria    Musculoskeletal: Negative for decreased ROM, pain, swelling, weakness   Neurological: Negative for dizziness, confusion, headache  Psychiatric: Negative for mood changes   Skin: Negative for itching, rash, ulcer    Hematologic/Lymph: Negative for bruising, easy bleeding  Allergic/Immunologic: Negative itching, sneezing, swelling      Physical Exam  Constitutional:       Appearance: Normal appearance. He is obese.   HENT:      Head: Normocephalic.      Mouth/Throat:      Mouth: Mucous membranes are moist.   Eyes:      Extraocular Movements: Extraocular movements intact.   Cardiovascular:      Rate and Rhythm: Normal rate and regular rhythm.   Pulmonary:      Effort: Pulmonary effort is normal.      Breath sounds: Normal breath sounds.   Abdominal:      General: Abdomen is flat.      Palpations: Abdomen is soft.   Genitourinary:     Comments: Catheter in place with collection bag on right leg   Musculoskeletal:         General: Normal range of motion.      Cervical back: Normal range of motion.   Skin:     General: Skin is warm and dry.   Neurological:      General: No focal deficit present.      Mental Status: He is alert.   Psychiatric:         Mood and Affect: Mood normal.        DINA  AIRWAY:   Airway:     Neck ROM::  Full   implants    Visit Vitals  /66   Pulse 89   Temp 36.2 °C (97.2 °F) (Temporal)   Resp 16       DASI Risk Score    No data to display       Caprini DVT Assessment      Flowsheet Row Most Recent Value   DVT Score 8   Current Status Major surgery planned, lasting 2-3 hours   Age 60-75 years   BMI 31-40 (Obesity)          Modified Frailty Index      Flowsheet Row Most Recent Value   Modified Frailty Index Calculator .1818          CHADS2 Stroke Risk  Current as of 48 minutes ago        N/A 3 to 100%: High Risk   2 to < 3%: Medium Risk   0 to < 2%: Low Risk     Last Change: N/A          This score determines the patient's risk of having a stroke if the patient has atrial fibrillation.        This score is not applicable to this patient. Components are not calculated.          Revised Cardiac Risk Index      Flowsheet Row Most Recent Value   Revised Cardiac Risk Calculator 0          Apfel Simplified Score    No data to display       Risk Analysis Index Results This Encounter         9/16/2024  1115             JOHNSON Cancer History: Patient does not indicate history of cancer    Total Risk Analysis Index Score Without Cancer: 33    Total Risk Analysis Index Score: 33          Stop Bang Score      Flowsheet Row Most Recent Value   Do you snore loudly? 0   Do you often feel tired or fatigued after your sleep? 1   Has anyone ever observed you stop breathing in your sleep? 1   Do you have or are you being treated for high blood pressure? 1   Recent BMI (Calculated) 38.6   Is BMI greater than 35 kg/m2? 1=Yes   Age older than 50 years old? 1=Yes   Is your neck circumference greater than 17 inches (Male) or 16 inches (Female)? 0   Gender - Male 1=Yes   STOP-BANG Total Score 6            Assessment and Plan:     Pre-Op  74-year-old male scheduled for robotic simple prostatectomy on 9/18/2024 with Dr. Monet.  Blood work recently completed on 9/6/2024 under labs.  Recent urine culture  completed on 2024 as well-results were normal.  Previous EKG on file from 9/3/2024 under cardiology. Otherwise no further orders indicated.     Cardiac  Hypertension, compliant with taking Norvasc  Hyperlipidemia, compliant with taking Lipitor  DASI Score: 23.45   MET Score: 5.6  RCRI 0, 3.9% risk for postoperative MACE    Pulmonary No diagnosis or significant findings on chart review or clinical presentation and evaluation.  -Preoperative deep breathing educational handout provided to patient.  STOP BAN  points which is a high risk for moderate to severe HILDA    Endocrine  -Type 2 diabetes mellitus, non-insulin-dependent-compliant with taking metformin and glimepiride  -Last hemoglobin A1c of 7% about 2 months ago    GI  GERD, takes occasional PPI  Apfel: 1 points 21% risk for post operative N/V    Uro  -BPH with LUTS, compliant with taking Flomax  -Urinary retention, indwelling Hull catheter present  -Following urology    Hematologic  -No hematological medical history, however due to high Caprini score of 5 patient is at HIGH risk for perioperative DVT, informative education handout provided    Skin check: Patient was instructed to make surgeon aware of any skin changes/concerns prior to surgery.     Anesthesia:  Patient denies any anesthesia complications.     See risk scores as previously documented.

## 2024-09-16 NOTE — H&P (VIEW-ONLY)
CPM/PAT Evaluation       Name: Kameron Candelaria (Kameron Candelaria)  /Age: 1950/74 y.o.     In-Person       Chief Complaint: enlarged prostate with urine retention    HPI    GF is a 75 yo male who has been under PCP and urology care for enlarged prostate with LUTS, requiring an indwelling zarate catheter recently. He has had some recent obstruction issues with the catheter and presented to ED for exchange. He is now scheduled for a robotic simple prostatectomy. Presents to CPM today for perioperative risk stratification and optimization. He continues to have the catheter in place with leg collection bag. He did have a recent UTI and treated with antibiotics.  Most recent urine culture from 2024 showed no growth. Otherwise denies any recent illness, fever/chills, chest pains or shortness of breath.      Past Medical History:   Diagnosis Date    Benign prostatic hyperplasia with lower urinary tract symptoms     Enlarged prostate with lower urinary tract symptoms (LUTS)    GERD (gastroesophageal reflux disease)     Overweight     Overweight    Personal history of other diseases of urinary system     History of hematuria    Personal history of other endocrine, nutritional and metabolic disease     History of morbid obesity    Personal history of other specified conditions     History of fatigue    Presence of other specified devices 2022    Indwelling Zarate catheter present    Tinea unguium     Onychomycosis of toenail    Type 2 diabetes mellitus (Multi)      PCP: Dr. Connelly    Lab Results   Component Value Date    WBC 9.2 2024    HGB 12.7 (L) 2024    HCT 38.7 (L) 2024    MCV 86 2024     2024     Lab Results   Component Value Date    GLUCOSE 152 (H) 2024    CALCIUM 8.6 2024     2024    K 4.1 2024    CO2 27 2024     2024    BUN 15 2024    CREATININE 0.92 2024       Hemoglobin A1C   Date Value Ref Range Status    07/09/2024 7.0 (H) see below % Final      Encounter Date: 09/03/24   ECG 12 lead   Result Value    Ventricular Rate 83    Atrial Rate 83    MN Interval 164    QRS Duration 64    QT Interval 350    QTC Calculation(Bazett) 411    P Axis 52    R Axis 38    T Axis 32    QRS Count 14    Q Onset 229    P Onset 147    P Offset 199    T Offset 404    QTC Fredericia 390    Narrative    Normal sinus rhythm  Poor R-wave progression  Otherwise normal ECG  No previous ECGs available  Confirmed by Mahamed Trimble ([id number]) on 9/15/2024 9:41:30 PM         Past Surgical History:   Procedure Laterality Date    OTHER SURGICAL HISTORY  02/06/2022    Colonoscopy    OTHER SURGICAL HISTORY  01/25/2022    Hand surgery       Patient  reports that he is not currently sexually active.    Family History   Problem Relation Name Age of Onset    Diabetes Mother      No Known Problems Father         No Known Allergies    Prior to Admission medications    Medication Sig Start Date End Date Taking? Authorizing Provider   acetaminophen (TylenoL) 325 mg tablet Take 2 tablets (650 mg) by mouth every 8 hours if needed for moderate pain (4 - 6).    Historical Provider, MD   amLODIPine (Norvasc) 5 mg tablet Take 1 tablet (5 mg) by mouth once daily. 9/4/24 10/4/24  Soren Villa MD   atorvastatin (Lipitor) 10 mg tablet Take 1 tablet (10 mg) by mouth once daily. 3/4/24   Sebastian Connelly MD   cephalexin (Keflex) 500 mg capsule Take 1 capsule (500 mg) by mouth 2 times a day for 7 days.  Patient not taking: Reported on 9/16/2024 9/6/24 9/13/24  Sarai Wu DO   glimepiride (Amaryl) 2 mg tablet Take 1 tablet (2 mg) by mouth once daily. 3/4/24   Sebastian Connelly MD   lancets 33 gauge misc 1 Lancet once daily. May provide what insurance will cover. 2/19/24   Sebastian Connelly MD   metFORMIN (Glucophage) 500 mg tablet Take 2 tablets (1,000 mg) by mouth 2 times a day. 3/4/24   Sebastian Connelly MD   OneTouch Ultra Test strip 1 strip by Does not apply route once  daily. 2/19/24   Sebastian Connelly MD   tamsulosin (Flomax) 0.4 mg 24 hr capsule TAKE 1 CAPSULE (0.4 MG) BY MOUTH ONCE DAILY  Patient taking differently: Take 1 capsule (0.4 mg) by mouth once daily at bedtime. 6/13/24   Sebastian Connelly MD        PAT ROS   Constitutional: Negative for fever, chills, or sweats   ENMT: Negative for nasal discharge, congestion, ear pain, mouth pain, throat pain   Respiratory: Negative for cough, wheezing, shortness of breath   Cardiac: Negative for chest pain, dyspnea on exertion, palpitations   Gastrointestinal: Negative for nausea, vomiting, diarrhea, constipation, abdominal pain    Genitourinary:   Indwelling urinary catheter in place, with burning at urethra tip at times and occasional hematuria    Musculoskeletal: Negative for decreased ROM, pain, swelling, weakness   Neurological: Negative for dizziness, confusion, headache  Psychiatric: Negative for mood changes   Skin: Negative for itching, rash, ulcer    Hematologic/Lymph: Negative for bruising, easy bleeding  Allergic/Immunologic: Negative itching, sneezing, swelling      Physical Exam  Constitutional:       Appearance: Normal appearance. He is obese.   HENT:      Head: Normocephalic.      Mouth/Throat:      Mouth: Mucous membranes are moist.   Eyes:      Extraocular Movements: Extraocular movements intact.   Cardiovascular:      Rate and Rhythm: Normal rate and regular rhythm.   Pulmonary:      Effort: Pulmonary effort is normal.      Breath sounds: Normal breath sounds.   Abdominal:      General: Abdomen is flat.      Palpations: Abdomen is soft.   Genitourinary:     Comments: Catheter in place with collection bag on right leg   Musculoskeletal:         General: Normal range of motion.      Cervical back: Normal range of motion.   Skin:     General: Skin is warm and dry.   Neurological:      General: No focal deficit present.      Mental Status: He is alert.   Psychiatric:         Mood and Affect: Mood normal.        DINA  AIRWAY:   Airway:     Neck ROM::  Full   implants    Visit Vitals  /66   Pulse 89   Temp 36.2 °C (97.2 °F) (Temporal)   Resp 16       DASI Risk Score    No data to display       Caprini DVT Assessment      Flowsheet Row Most Recent Value   DVT Score 8   Current Status Major surgery planned, lasting 2-3 hours   Age 60-75 years   BMI 31-40 (Obesity)          Modified Frailty Index      Flowsheet Row Most Recent Value   Modified Frailty Index Calculator .1818          CHADS2 Stroke Risk  Current as of 48 minutes ago        N/A 3 to 100%: High Risk   2 to < 3%: Medium Risk   0 to < 2%: Low Risk     Last Change: N/A          This score determines the patient's risk of having a stroke if the patient has atrial fibrillation.        This score is not applicable to this patient. Components are not calculated.          Revised Cardiac Risk Index      Flowsheet Row Most Recent Value   Revised Cardiac Risk Calculator 0          Apfel Simplified Score    No data to display       Risk Analysis Index Results This Encounter         9/16/2024  1115             JOHNSON Cancer History: Patient does not indicate history of cancer    Total Risk Analysis Index Score Without Cancer: 33    Total Risk Analysis Index Score: 33          Stop Bang Score      Flowsheet Row Most Recent Value   Do you snore loudly? 0   Do you often feel tired or fatigued after your sleep? 1   Has anyone ever observed you stop breathing in your sleep? 1   Do you have or are you being treated for high blood pressure? 1   Recent BMI (Calculated) 38.6   Is BMI greater than 35 kg/m2? 1=Yes   Age older than 50 years old? 1=Yes   Is your neck circumference greater than 17 inches (Male) or 16 inches (Female)? 0   Gender - Male 1=Yes   STOP-BANG Total Score 6            Assessment and Plan:     Pre-Op  74-year-old male scheduled for robotic simple prostatectomy on 9/18/2024 with Dr. Monet.  Blood work recently completed on 9/6/2024 under labs.  Recent urine culture  completed on 2024 as well-results were normal.  Previous EKG on file from 9/3/2024 under cardiology. Otherwise no further orders indicated.     Cardiac  Hypertension, compliant with taking Norvasc  Hyperlipidemia, compliant with taking Lipitor  DASI Score: 23.45   MET Score: 5.6  RCRI 0, 3.9% risk for postoperative MACE    Pulmonary No diagnosis or significant findings on chart review or clinical presentation and evaluation.  -Preoperative deep breathing educational handout provided to patient.  STOP BAN  points which is a high risk for moderate to severe HILDA    Endocrine  -Type 2 diabetes mellitus, non-insulin-dependent-compliant with taking metformin and glimepiride  -Last hemoglobin A1c of 7% about 2 months ago    GI  GERD, takes occasional PPI  Apfel: 1 points 21% risk for post operative N/V    Uro  -BPH with LUTS, compliant with taking Flomax  -Urinary retention, indwelling Hull catheter present  -Following urology    Hematologic  -No hematological medical history, however due to high Caprini score of 5 patient is at HIGH risk for perioperative DVT, informative education handout provided    Skin check: Patient was instructed to make surgeon aware of any skin changes/concerns prior to surgery.     Anesthesia:  Patient denies any anesthesia complications.     See risk scores as previously documented.

## 2024-09-18 ENCOUNTER — HOSPITAL ENCOUNTER (OUTPATIENT)
Facility: HOSPITAL | Age: 74
Discharge: HOME | End: 2024-09-19
Attending: UROLOGY | Admitting: UROLOGY
Payer: COMMERCIAL

## 2024-09-18 ENCOUNTER — PATIENT OUTREACH (OUTPATIENT)
Dept: CARDIOLOGY | Facility: CLINIC | Age: 74
End: 2024-09-18
Payer: COMMERCIAL

## 2024-09-18 ENCOUNTER — ANESTHESIA (OUTPATIENT)
Dept: OPERATING ROOM | Facility: HOSPITAL | Age: 74
End: 2024-09-18
Payer: COMMERCIAL

## 2024-09-18 ENCOUNTER — ANESTHESIA EVENT (OUTPATIENT)
Dept: OPERATING ROOM | Facility: HOSPITAL | Age: 74
End: 2024-09-18
Payer: COMMERCIAL

## 2024-09-18 DIAGNOSIS — N13.8 BPH WITH URINARY OBSTRUCTION: ICD-10-CM

## 2024-09-18 DIAGNOSIS — N40.1 BPH WITH URINARY OBSTRUCTION: ICD-10-CM

## 2024-09-18 DIAGNOSIS — N13.8 BPH WITH OBSTRUCTION/LOWER URINARY TRACT SYMPTOMS: Primary | ICD-10-CM

## 2024-09-18 DIAGNOSIS — N40.1 BPH WITH OBSTRUCTION/LOWER URINARY TRACT SYMPTOMS: Primary | ICD-10-CM

## 2024-09-18 DIAGNOSIS — R79.1 ABNORMAL COAGULATION PROFILE: ICD-10-CM

## 2024-09-18 PROBLEM — E11.9 DIABETES MELLITUS, TYPE 2 (MULTI): Status: ACTIVE | Noted: 2024-09-18

## 2024-09-18 PROBLEM — G47.33 OSA (OBSTRUCTIVE SLEEP APNEA): Status: ACTIVE | Noted: 2024-09-18

## 2024-09-18 LAB
ABO GROUP (TYPE) IN BLOOD: NORMAL
ANTIBODY SCREEN: NORMAL
GLUCOSE BLD MANUAL STRIP-MCNC: 197 MG/DL (ref 74–99)
GLUCOSE BLD MANUAL STRIP-MCNC: 315 MG/DL (ref 74–99)
GLUCOSE BLD MANUAL STRIP-MCNC: 316 MG/DL (ref 74–99)
RH FACTOR (ANTIGEN D): NORMAL

## 2024-09-18 PROCEDURE — 82947 ASSAY GLUCOSE BLOOD QUANT: CPT

## 2024-09-18 PROCEDURE — 2500000005 HC RX 250 GENERAL PHARMACY W/O HCPCS: Performed by: STUDENT IN AN ORGANIZED HEALTH CARE EDUCATION/TRAINING PROGRAM

## 2024-09-18 PROCEDURE — 2500000004 HC RX 250 GENERAL PHARMACY W/ HCPCS (ALT 636 FOR OP/ED): Performed by: ANESTHESIOLOGY

## 2024-09-18 PROCEDURE — 2500000001 HC RX 250 WO HCPCS SELF ADMINISTERED DRUGS (ALT 637 FOR MEDICARE OP): Performed by: STUDENT IN AN ORGANIZED HEALTH CARE EDUCATION/TRAINING PROGRAM

## 2024-09-18 PROCEDURE — 7100000001 HC RECOVERY ROOM TIME - INITIAL BASE CHARGE: Performed by: UROLOGY

## 2024-09-18 PROCEDURE — 88307 TISSUE EXAM BY PATHOLOGIST: CPT | Mod: TC,STJLAB | Performed by: UROLOGY

## 2024-09-18 PROCEDURE — A55866 PR LAP,PROSTATECTOMY,RADICAL,W/NERVE SPARE,INCL ROBOTIC: Performed by: ANESTHESIOLOGIST ASSISTANT

## 2024-09-18 PROCEDURE — 55876 PLACE RT DEVICE/MARKER PROS: CPT

## 2024-09-18 PROCEDURE — 3600000018 HC OR TIME - INITIAL BASE CHARGE - PROCEDURE LEVEL SIX: Performed by: UROLOGY

## 2024-09-18 PROCEDURE — 86901 BLOOD TYPING SEROLOGIC RH(D): CPT | Performed by: UROLOGY

## 2024-09-18 PROCEDURE — 3700000002 HC GENERAL ANESTHESIA TIME - EACH INCREMENTAL 1 MINUTE: Performed by: UROLOGY

## 2024-09-18 PROCEDURE — 86850 RBC ANTIBODY SCREEN: CPT

## 2024-09-18 PROCEDURE — 2780000003 HC OR 278 NO HCPCS: Performed by: UROLOGY

## 2024-09-18 PROCEDURE — 2500000004 HC RX 250 GENERAL PHARMACY W/ HCPCS (ALT 636 FOR OP/ED): Performed by: ANESTHESIOLOGIST ASSISTANT

## 2024-09-18 PROCEDURE — 55867 LAPS SURG PRST8ECT SMPL STOT: CPT | Performed by: UROLOGY

## 2024-09-18 PROCEDURE — 3600000017 HC OR TIME - EACH INCREMENTAL 1 MINUTE - PROCEDURE LEVEL SIX: Performed by: UROLOGY

## 2024-09-18 PROCEDURE — 3700000001 HC GENERAL ANESTHESIA TIME - INITIAL BASE CHARGE: Performed by: UROLOGY

## 2024-09-18 PROCEDURE — A55866 PR LAP,PROSTATECTOMY,RADICAL,W/NERVE SPARE,INCL ROBOTIC: Performed by: ANESTHESIOLOGY

## 2024-09-18 PROCEDURE — C1889 IMPLANT/INSERT DEVICE, NOC: HCPCS | Performed by: UROLOGY

## 2024-09-18 PROCEDURE — 2500000002 HC RX 250 W HCPCS SELF ADMINISTERED DRUGS (ALT 637 FOR MEDICARE OP, ALT 636 FOR OP/ED): Performed by: STUDENT IN AN ORGANIZED HEALTH CARE EDUCATION/TRAINING PROGRAM

## 2024-09-18 PROCEDURE — 7100000002 HC RECOVERY ROOM TIME - EACH INCREMENTAL 1 MINUTE: Performed by: UROLOGY

## 2024-09-18 PROCEDURE — 2500000004 HC RX 250 GENERAL PHARMACY W/ HCPCS (ALT 636 FOR OP/ED): Performed by: UROLOGY

## 2024-09-18 PROCEDURE — 7100000011 HC EXTENDED STAY RECOVERY HOURLY - NURSING UNIT

## 2024-09-18 PROCEDURE — 2500000005 HC RX 250 GENERAL PHARMACY W/O HCPCS: Performed by: ANESTHESIOLOGIST ASSISTANT

## 2024-09-18 PROCEDURE — 36415 COLL VENOUS BLD VENIPUNCTURE: CPT | Performed by: UROLOGY

## 2024-09-18 PROCEDURE — 2500000004 HC RX 250 GENERAL PHARMACY W/ HCPCS (ALT 636 FOR OP/ED): Mod: JZ | Performed by: UROLOGY

## 2024-09-18 PROCEDURE — 86901 BLOOD TYPING SEROLOGIC RH(D): CPT

## 2024-09-18 PROCEDURE — 99100 ANES PT EXTEME AGE<1 YR&>70: CPT | Performed by: ANESTHESIOLOGY

## 2024-09-18 PROCEDURE — 86900 BLOOD TYPING SEROLOGIC ABO: CPT

## 2024-09-18 PROCEDURE — 2500000004 HC RX 250 GENERAL PHARMACY W/ HCPCS (ALT 636 FOR OP/ED): Performed by: STUDENT IN AN ORGANIZED HEALTH CARE EDUCATION/TRAINING PROGRAM

## 2024-09-18 PROCEDURE — 2720000007 HC OR 272 NO HCPCS: Performed by: UROLOGY

## 2024-09-18 PROCEDURE — 88307 TISSUE EXAM BY PATHOLOGIST: CPT | Performed by: PATHOLOGY

## 2024-09-18 PROCEDURE — C1788 PORT, INDWELLING, IMP: HCPCS | Performed by: UROLOGY

## 2024-09-18 RX ORDER — BUPIVACAINE HYDROCHLORIDE 5 MG/ML
INJECTION, SOLUTION EPIDURAL; INTRACAUDAL AS NEEDED
Status: DISCONTINUED | OUTPATIENT
Start: 2024-09-18 | End: 2024-09-18 | Stop reason: HOSPADM

## 2024-09-18 RX ORDER — NALOXONE HYDROCHLORIDE 0.4 MG/ML
0.2 INJECTION, SOLUTION INTRAMUSCULAR; INTRAVENOUS; SUBCUTANEOUS EVERY 5 MIN PRN
Status: DISCONTINUED | OUTPATIENT
Start: 2024-09-18 | End: 2024-09-19 | Stop reason: HOSPADM

## 2024-09-18 RX ORDER — SODIUM CHLORIDE, SODIUM LACTATE, POTASSIUM CHLORIDE, CALCIUM CHLORIDE 600; 310; 30; 20 MG/100ML; MG/100ML; MG/100ML; MG/100ML
75 INJECTION, SOLUTION INTRAVENOUS CONTINUOUS
Status: DISCONTINUED | OUTPATIENT
Start: 2024-09-18 | End: 2024-09-19

## 2024-09-18 RX ORDER — LIDOCAINE HYDROCHLORIDE 20 MG/ML
INJECTION, SOLUTION EPIDURAL; INFILTRATION; INTRACAUDAL; PERINEURAL AS NEEDED
Status: DISCONTINUED | OUTPATIENT
Start: 2024-09-18 | End: 2024-09-18

## 2024-09-18 RX ORDER — TALC
3 POWDER (GRAM) TOPICAL NIGHTLY PRN
Status: DISCONTINUED | OUTPATIENT
Start: 2024-09-18 | End: 2024-09-19 | Stop reason: HOSPADM

## 2024-09-18 RX ORDER — LIDOCAINE 560 MG/1
1 PATCH PERCUTANEOUS; TOPICAL; TRANSDERMAL DAILY
Status: DISCONTINUED | OUTPATIENT
Start: 2024-09-18 | End: 2024-09-19 | Stop reason: HOSPADM

## 2024-09-18 RX ORDER — OXYBUTYNIN CHLORIDE 5 MG/1
5 TABLET ORAL 3 TIMES DAILY
Status: DISCONTINUED | OUTPATIENT
Start: 2024-09-18 | End: 2024-09-19 | Stop reason: HOSPADM

## 2024-09-18 RX ORDER — ATORVASTATIN CALCIUM 10 MG/1
10 TABLET, FILM COATED ORAL NIGHTLY
Status: DISCONTINUED | OUTPATIENT
Start: 2024-09-18 | End: 2024-09-19 | Stop reason: HOSPADM

## 2024-09-18 RX ORDER — ONDANSETRON HYDROCHLORIDE 2 MG/ML
INJECTION, SOLUTION INTRAVENOUS AS NEEDED
Status: DISCONTINUED | OUTPATIENT
Start: 2024-09-18 | End: 2024-09-18

## 2024-09-18 RX ORDER — HYDROMORPHONE HYDROCHLORIDE 0.2 MG/ML
0.2 INJECTION INTRAMUSCULAR; INTRAVENOUS; SUBCUTANEOUS EVERY 5 MIN PRN
Status: DISCONTINUED | OUTPATIENT
Start: 2024-09-18 | End: 2024-09-18 | Stop reason: HOSPADM

## 2024-09-18 RX ORDER — HYDROMORPHONE HYDROCHLORIDE 1 MG/ML
INJECTION, SOLUTION INTRAMUSCULAR; INTRAVENOUS; SUBCUTANEOUS AS NEEDED
Status: DISCONTINUED | OUTPATIENT
Start: 2024-09-18 | End: 2024-09-18

## 2024-09-18 RX ORDER — SODIUM CHLORIDE, SODIUM LACTATE, POTASSIUM CHLORIDE, CALCIUM CHLORIDE 600; 310; 30; 20 MG/100ML; MG/100ML; MG/100ML; MG/100ML
100 INJECTION, SOLUTION INTRAVENOUS CONTINUOUS
Status: DISCONTINUED | OUTPATIENT
Start: 2024-09-18 | End: 2024-09-18 | Stop reason: HOSPADM

## 2024-09-18 RX ORDER — PHENYLEPHRINE HCL IN 0.9% NACL 1 MG/10 ML
SYRINGE (ML) INTRAVENOUS AS NEEDED
Status: DISCONTINUED | OUTPATIENT
Start: 2024-09-18 | End: 2024-09-18

## 2024-09-18 RX ORDER — INSULIN LISPRO 100 [IU]/ML
0-5 INJECTION, SOLUTION INTRAVENOUS; SUBCUTANEOUS
Status: DISCONTINUED | OUTPATIENT
Start: 2024-09-18 | End: 2024-09-19 | Stop reason: HOSPADM

## 2024-09-18 RX ORDER — POLYETHYLENE GLYCOL 3350 17 G/17G
17 POWDER, FOR SOLUTION ORAL DAILY
Status: DISCONTINUED | OUTPATIENT
Start: 2024-09-18 | End: 2024-09-19 | Stop reason: HOSPADM

## 2024-09-18 RX ORDER — BISACODYL 5 MG
10 TABLET, DELAYED RELEASE (ENTERIC COATED) ORAL DAILY PRN
Status: DISCONTINUED | OUTPATIENT
Start: 2024-09-18 | End: 2024-09-19 | Stop reason: HOSPADM

## 2024-09-18 RX ORDER — ROCURONIUM BROMIDE 50 MG/5 ML
SYRINGE (ML) INTRAVENOUS AS NEEDED
Status: DISCONTINUED | OUTPATIENT
Start: 2024-09-18 | End: 2024-09-18

## 2024-09-18 RX ORDER — SENNOSIDES 8.6 MG/1
2 TABLET ORAL 2 TIMES DAILY
Status: DISCONTINUED | OUTPATIENT
Start: 2024-09-18 | End: 2024-09-19 | Stop reason: HOSPADM

## 2024-09-18 RX ORDER — ONDANSETRON 4 MG/1
4 TABLET, ORALLY DISINTEGRATING ORAL EVERY 8 HOURS PRN
Status: DISCONTINUED | OUTPATIENT
Start: 2024-09-18 | End: 2024-09-19 | Stop reason: HOSPADM

## 2024-09-18 RX ORDER — ONDANSETRON HYDROCHLORIDE 2 MG/ML
4 INJECTION, SOLUTION INTRAVENOUS ONCE AS NEEDED
Status: DISCONTINUED | OUTPATIENT
Start: 2024-09-18 | End: 2024-09-18 | Stop reason: HOSPADM

## 2024-09-18 RX ORDER — HYDROMORPHONE HYDROCHLORIDE 1 MG/ML
0.1 INJECTION, SOLUTION INTRAMUSCULAR; INTRAVENOUS; SUBCUTANEOUS EVERY 4 HOURS PRN
Status: DISCONTINUED | OUTPATIENT
Start: 2024-09-18 | End: 2024-09-19 | Stop reason: HOSPADM

## 2024-09-18 RX ORDER — SIMETHICONE 80 MG
160 TABLET,CHEWABLE ORAL
Status: DISCONTINUED | OUTPATIENT
Start: 2024-09-18 | End: 2024-09-19 | Stop reason: HOSPADM

## 2024-09-18 RX ORDER — OXYCODONE HYDROCHLORIDE 5 MG/1
5 TABLET ORAL EVERY 4 HOURS PRN
Status: DISCONTINUED | OUTPATIENT
Start: 2024-09-18 | End: 2024-09-18 | Stop reason: HOSPADM

## 2024-09-18 RX ORDER — OXYCODONE HYDROCHLORIDE 5 MG/1
5 TABLET ORAL EVERY 6 HOURS PRN
Status: DISCONTINUED | OUTPATIENT
Start: 2024-09-18 | End: 2024-09-19 | Stop reason: HOSPADM

## 2024-09-18 RX ORDER — AMLODIPINE BESYLATE 5 MG/1
5 TABLET ORAL DAILY
Status: DISCONTINUED | OUTPATIENT
Start: 2024-09-18 | End: 2024-09-19 | Stop reason: HOSPADM

## 2024-09-18 RX ORDER — METHOCARBAMOL 500 MG/1
500 TABLET, FILM COATED ORAL EVERY 8 HOURS SCHEDULED
Status: DISCONTINUED | OUTPATIENT
Start: 2024-09-18 | End: 2024-09-19 | Stop reason: HOSPADM

## 2024-09-18 RX ORDER — ONDANSETRON HYDROCHLORIDE 2 MG/ML
4 INJECTION, SOLUTION INTRAVENOUS EVERY 8 HOURS PRN
Status: DISCONTINUED | OUTPATIENT
Start: 2024-09-18 | End: 2024-09-19 | Stop reason: HOSPADM

## 2024-09-18 RX ORDER — LIDOCAINE HYDROCHLORIDE 10 MG/ML
0.1 INJECTION, SOLUTION INFILTRATION; PERINEURAL ONCE
Status: DISCONTINUED | OUTPATIENT
Start: 2024-09-18 | End: 2024-09-18 | Stop reason: HOSPADM

## 2024-09-18 RX ORDER — OXYCODONE HYDROCHLORIDE 10 MG/1
10 TABLET ORAL EVERY 6 HOURS PRN
Status: DISCONTINUED | OUTPATIENT
Start: 2024-09-18 | End: 2024-09-19 | Stop reason: HOSPADM

## 2024-09-18 RX ORDER — ACETAMINOPHEN 325 MG/1
975 TABLET ORAL EVERY 6 HOURS
Status: DISCONTINUED | OUTPATIENT
Start: 2024-09-18 | End: 2024-09-19 | Stop reason: HOSPADM

## 2024-09-18 RX ORDER — CEFAZOLIN SODIUM 2 G/100ML
2 INJECTION, SOLUTION INTRAVENOUS ONCE
Status: COMPLETED | OUTPATIENT
Start: 2024-09-18 | End: 2024-09-18

## 2024-09-18 RX ORDER — PROPOFOL 10 MG/ML
INJECTION, EMULSION INTRAVENOUS AS NEEDED
Status: DISCONTINUED | OUTPATIENT
Start: 2024-09-18 | End: 2024-09-18

## 2024-09-18 RX ORDER — FENTANYL CITRATE 50 UG/ML
INJECTION, SOLUTION INTRAMUSCULAR; INTRAVENOUS AS NEEDED
Status: DISCONTINUED | OUTPATIENT
Start: 2024-09-18 | End: 2024-09-18

## 2024-09-18 RX ORDER — SODIUM CHLORIDE, SODIUM LACTATE, POTASSIUM CHLORIDE, CALCIUM CHLORIDE 600; 310; 30; 20 MG/100ML; MG/100ML; MG/100ML; MG/100ML
20 INJECTION, SOLUTION INTRAVENOUS CONTINUOUS
Status: DISCONTINUED | OUTPATIENT
Start: 2024-09-18 | End: 2024-09-19

## 2024-09-18 RX ORDER — ACETAMINOPHEN 325 MG/1
975 TABLET ORAL ONCE
Status: COMPLETED | OUTPATIENT
Start: 2024-09-18 | End: 2024-09-18

## 2024-09-18 RX ORDER — TRANEXAMIC ACID 100 MG/ML
INJECTION, SOLUTION INTRAVENOUS AS NEEDED
Status: DISCONTINUED | OUTPATIENT
Start: 2024-09-18 | End: 2024-09-18

## 2024-09-18 RX ADMIN — METHOCARBAMOL TABLETS 500 MG: 500 TABLET, COATED ORAL at 16:47

## 2024-09-18 RX ADMIN — OXYBUTYNIN CHLORIDE 5 MG: 5 TABLET ORAL at 20:26

## 2024-09-18 RX ADMIN — HYDROMORPHONE HYDROCHLORIDE 0.5 MG: 1 INJECTION, SOLUTION INTRAMUSCULAR; INTRAVENOUS; SUBCUTANEOUS at 14:53

## 2024-09-18 RX ADMIN — ACETAMINOPHEN 975 MG: 325 TABLET ORAL at 21:30

## 2024-09-18 RX ADMIN — AMLODIPINE BESYLATE 5 MG: 5 TABLET ORAL at 16:47

## 2024-09-18 RX ADMIN — ATORVASTATIN CALCIUM 10 MG: 10 TABLET, FILM COATED ORAL at 20:26

## 2024-09-18 RX ADMIN — SODIUM CHLORIDE, POTASSIUM CHLORIDE, SODIUM LACTATE AND CALCIUM CHLORIDE 20 ML/HR: 600; 310; 30; 20 INJECTION, SOLUTION INTRAVENOUS at 06:42

## 2024-09-18 RX ADMIN — SIMETHICONE 160 MG: 80 TABLET, CHEWABLE ORAL at 18:02

## 2024-09-18 RX ADMIN — SODIUM CHLORIDE, POTASSIUM CHLORIDE, SODIUM LACTATE AND CALCIUM CHLORIDE 75 ML/HR: 600; 310; 30; 20 INJECTION, SOLUTION INTRAVENOUS at 16:17

## 2024-09-18 RX ADMIN — HYDROMORPHONE HYDROCHLORIDE 0.5 MG: 1 INJECTION, SOLUTION INTRAMUSCULAR; INTRAVENOUS; SUBCUTANEOUS at 14:35

## 2024-09-18 RX ADMIN — METHOCARBAMOL TABLETS 500 MG: 500 TABLET, COATED ORAL at 21:29

## 2024-09-18 RX ADMIN — POLYETHYLENE GLYCOL 3350 17 G: 17 POWDER, FOR SOLUTION ORAL at 16:47

## 2024-09-18 RX ADMIN — OXYBUTYNIN CHLORIDE 5 MG: 5 TABLET ORAL at 16:47

## 2024-09-18 RX ADMIN — SENNOSIDES 17.2 MG: 8.6 TABLET, FILM COATED ORAL at 20:26

## 2024-09-18 RX ADMIN — LIDOCAINE 4% 1 PATCH: 40 PATCH TOPICAL at 16:46

## 2024-09-18 RX ADMIN — ACETAMINOPHEN 975 MG: 325 TABLET ORAL at 06:42

## 2024-09-18 RX ADMIN — INSULIN LISPRO 4 UNITS: 100 INJECTION, SOLUTION INTRAVENOUS; SUBCUTANEOUS at 18:01

## 2024-09-18 RX ADMIN — ACETAMINOPHEN 975 MG: 325 TABLET ORAL at 16:47

## 2024-09-18 RX ADMIN — OXYCODONE HYDROCHLORIDE 5 MG: 5 TABLET ORAL at 20:24

## 2024-09-18 RX ADMIN — SODIUM CHLORIDE, POTASSIUM CHLORIDE, SODIUM LACTATE AND CALCIUM CHLORIDE 75 ML/HR: 600; 310; 30; 20 INJECTION, SOLUTION INTRAVENOUS at 23:00

## 2024-09-18 SDOH — SOCIAL STABILITY: SOCIAL INSECURITY: ABUSE: ADULT

## 2024-09-18 SDOH — SOCIAL STABILITY: SOCIAL INSECURITY: DOES ANYONE TRY TO KEEP YOU FROM HAVING/CONTACTING OTHER FRIENDS OR DOING THINGS OUTSIDE YOUR HOME?: NO

## 2024-09-18 SDOH — SOCIAL STABILITY: SOCIAL INSECURITY: WERE YOU ABLE TO COMPLETE ALL THE BEHAVIORAL HEALTH SCREENINGS?: YES

## 2024-09-18 SDOH — SOCIAL STABILITY: SOCIAL INSECURITY: DO YOU FEEL UNSAFE GOING BACK TO THE PLACE WHERE YOU ARE LIVING?: NO

## 2024-09-18 SDOH — SOCIAL STABILITY: SOCIAL INSECURITY: ARE YOU OR HAVE YOU BEEN THREATENED OR ABUSED PHYSICALLY, EMOTIONALLY, OR SEXUALLY BY ANYONE?: NO

## 2024-09-18 SDOH — SOCIAL STABILITY: SOCIAL INSECURITY: HAS ANYONE EVER THREATENED TO HURT YOUR FAMILY OR YOUR PETS?: NO

## 2024-09-18 SDOH — HEALTH STABILITY: MENTAL HEALTH: CURRENT SMOKER: 0

## 2024-09-18 SDOH — SOCIAL STABILITY: SOCIAL INSECURITY: DO YOU FEEL ANYONE HAS EXPLOITED OR TAKEN ADVANTAGE OF YOU FINANCIALLY OR OF YOUR PERSONAL PROPERTY?: NO

## 2024-09-18 SDOH — SOCIAL STABILITY: SOCIAL INSECURITY: HAVE YOU HAD THOUGHTS OF HARMING ANYONE ELSE?: NO

## 2024-09-18 SDOH — SOCIAL STABILITY: SOCIAL INSECURITY: ARE THERE ANY APPARENT SIGNS OF INJURIES/BEHAVIORS THAT COULD BE RELATED TO ABUSE/NEGLECT?: NO

## 2024-09-18 SDOH — SOCIAL STABILITY: SOCIAL INSECURITY: HAVE YOU HAD ANY THOUGHTS OF HARMING ANYONE ELSE?: NO

## 2024-09-18 ASSESSMENT — ACTIVITIES OF DAILY LIVING (ADL)
TOILETING: INDEPENDENT
BATHING: INDEPENDENT
LACK_OF_TRANSPORTATION: NO
PATIENT'S MEMORY ADEQUATE TO SAFELY COMPLETE DAILY ACTIVITIES?: YES
WALKS IN HOME: INDEPENDENT
DRESSING YOURSELF: INDEPENDENT
HEARING - RIGHT EAR: FUNCTIONAL
FEEDING YOURSELF: INDEPENDENT
HEARING - LEFT EAR: FUNCTIONAL
GROOMING: INDEPENDENT
ADEQUATE_TO_COMPLETE_ADL: YES
JUDGMENT_ADEQUATE_SAFELY_COMPLETE_DAILY_ACTIVITIES: YES

## 2024-09-18 ASSESSMENT — PAIN SCALES - GENERAL
PAINLEVEL_OUTOF10: 10 - WORST POSSIBLE PAIN
PAINLEVEL_OUTOF10: 0 - NO PAIN
PAINLEVEL_OUTOF10: 4
PAINLEVEL_OUTOF10: 5 - MODERATE PAIN

## 2024-09-18 ASSESSMENT — LIFESTYLE VARIABLES
PRESCIPTION_ABUSE_PAST_12_MONTHS: NO
SUBSTANCE_ABUSE_PAST_12_MONTHS: NO
HOW OFTEN DO YOU HAVE 6 OR MORE DRINKS ON ONE OCCASION: NEVER
HOW OFTEN DO YOU HAVE A DRINK CONTAINING ALCOHOL: NEVER
HOW MANY STANDARD DRINKS CONTAINING ALCOHOL DO YOU HAVE ON A TYPICAL DAY: PATIENT DOES NOT DRINK
SKIP TO QUESTIONS 9-10: 1
AUDIT-C TOTAL SCORE: 0
AUDIT-C TOTAL SCORE: 0

## 2024-09-18 ASSESSMENT — COGNITIVE AND FUNCTIONAL STATUS - GENERAL
WALKING IN HOSPITAL ROOM: A LITTLE
MOVING TO AND FROM BED TO CHAIR: A LITTLE
TURNING FROM BACK TO SIDE WHILE IN FLAT BAD: A LITTLE
PERSONAL GROOMING: A LITTLE
DRESSING REGULAR UPPER BODY CLOTHING: A LITTLE
HELP NEEDED FOR BATHING: A LITTLE
DRESSING REGULAR LOWER BODY CLOTHING: A LITTLE
CLIMB 3 TO 5 STEPS WITH RAILING: A LITTLE
TOILETING: A LITTLE
STANDING UP FROM CHAIR USING ARMS: A LITTLE
MOBILITY SCORE: 18
MOVING FROM LYING ON BACK TO SITTING ON SIDE OF FLAT BED WITH BEDRAILS: A LITTLE
DAILY ACTIVITIY SCORE: 19
PATIENT BASELINE BEDBOUND: NO

## 2024-09-18 ASSESSMENT — PAIN - FUNCTIONAL ASSESSMENT
PAIN_FUNCTIONAL_ASSESSMENT: 0-10

## 2024-09-18 ASSESSMENT — PAIN DESCRIPTION - ORIENTATION: ORIENTATION: LEFT;RIGHT

## 2024-09-18 ASSESSMENT — PAIN DESCRIPTION - LOCATION
LOCATION: ABDOMEN
LOCATION: ABDOMEN

## 2024-09-18 ASSESSMENT — PAIN SCALES - WONG BAKER: WONGBAKER_NUMERICALRESPONSE: HURTS LITTLE BIT

## 2024-09-18 NOTE — PROGRESS NOTES
Pt due for outreach today after PCP hospital follow up on 9/9/24- noted that patient is currently at [facility].  Procedures  Robotic simple prostatectomy  44412 - [place] LAPS SURG [id number] SMPL STOT ROBOTIC ASSISTANCE

## 2024-09-18 NOTE — ANESTHESIA POSTPROCEDURE EVALUATION
Patient: Kameron Candelaria    Procedure Summary       Date: 09/18/24 Room / Location: [facility] OR 08 / Virtual [facility] OR    Anesthesia Start: 0746 Anesthesia Stop:     Procedure: Robotic simple prostatectomy Diagnosis:       BPH with urinary obstruction      (BPH with urinary obstruction [N40.1, N13.8])    Surgeons: Wilfredo Monet MD Responsible Provider: Eduin Harris MD    Anesthesia Type: general ASA Status: 3            Anesthesia Type: general    Vitals Value Taken Time   /66 09/18/24 1530   Temp 36.1 °C (97 °F) 09/18/24 1500   Pulse 92 09/18/24 1531   Resp 9 09/18/24 1531   SpO2 95 % 09/18/24 1531   Vitals shown include unfiled device data.    Anesthesia Post Evaluation    Patient location during evaluation: PACU  Patient participation: complete - patient participated  Level of consciousness: awake and alert  Pain management: adequate  Airway patency: patent  Cardiovascular status: acceptable  Respiratory status: acceptable  Hydration status: acceptable  Postoperative Nausea and Vomiting: none        No notable events documented.

## 2024-09-18 NOTE — ANESTHESIA PREPROCEDURE EVALUATION
Patient: Kameron Candelaria    Procedure Information       Date/Time: 09/18/24 0730    Procedure: Robotic simple prostatectomy    Location: [facility] OR 08 / [facility] OR    Surgeons: Wilfredo Monet MD            Relevant Problems   Cardiac   (+) Hyperlipidemia   (+) Hypertension   (+) PAD (peripheral artery disease) (CMS-HCC)   (+) Pleurodynia      Pulmonary   (+) HILDA (obstructive sleep apnea) (Risk)      Neuro   (+) Peripheral neuropathy      GI   (+) GERD without esophagitis      /Renal   (+) BPH with urinary obstruction   (+) Benign prostatic hyperplasia with urinary retention      Endocrine   (+) Diabetes mellitus, type 2 (Multi) (Not on insulin)   (+) Obesity, morbid (Multi)      Hematology   (+) Anemia      ID   (+) Onychomycosis of toenail       Clinical information reviewed:    Allergies  Meds               NPO Detail:  NPO/Void Status  Date of Last Liquid: 09/18/24  Time of Last Liquid: 0645  Date of Last Solid: 09/17/24  Time of Last Solid: 1600         Physical Exam    Airway  Mallampati: III  TM distance: >3 FB  Neck ROM: full     Cardiovascular   Rhythm: regular  Rate: normal     Dental - normal exam     Pulmonary   Breath sounds clear to auscultation     Abdominal            Anesthesia Plan    History of general anesthesia?: yes  History of complications of general anesthesia?: no    ASA 3     general     The patient is not a current smoker.  Education provided regarding risk of obstructive sleep apnea.  intravenous induction   Anesthetic plan and risks discussed with patient.

## 2024-09-18 NOTE — ANESTHESIA PROCEDURE NOTES
Airway  Date/Time: 9/18/2024 7:54 AM  Urgency: elective    Airway not difficult    Staffing  Performed: JAMI   Authorized by: Eduin Harris MD    Performed by: JAMI Cantor  Patient location during procedure: OR    Indications and Patient Condition  Indications for airway management: anesthesia  Spontaneous Ventilation: absent  Sedation level: deep  Preoxygenated: yes  Patient position: sniffing  Mask difficulty assessment: 2 - vent by mask + OA or adjuvant +/- NMBA    Final Airway Details  Final airway type: endotracheal airway      Successful airway: ETT  Cuffed: yes   Successful intubation technique: direct laryngoscopy  Facilitating devices/methods: intubating stylet and cricoid pressure  Endotracheal tube insertion site: oral  Blade: Nishant  Blade size: #4  ETT size (mm): 7.5  Cormack-Lehane Classification: grade I - full view of glottis  Placement verified by: chest auscultation and capnometry   Measured from: lips  ETT to lips (cm): 23  Number of attempts at approach: 1

## 2024-09-18 NOTE — ANESTHESIA PROCEDURE NOTES
Peripheral IV  Date/Time: 9/18/2024 7:59 AM  Inserted by: Perla Barraza    Placement  Needle size: 18 G  Laterality: right  Location: hand  Local anesthetic: none  Site prep: chlorhexidine  Technique: anatomical landmarks  Attempts: 2

## 2024-09-18 NOTE — OP NOTE
Robotic simple prostatectomy Operative Note     Date: 2024  OR Location: [facility] OR    Name: Kameron Candelaria, : 1950, Age: 74 y.o., MRN: [id number], Sex: male    Diagnosis  Pre-op Diagnosis      * BPH with urinary obstruction [N40.1, N13.8] Post-op Diagnosis     * BPH with urinary obstruction [N40.1, N13.8]     Procedures  Robotic simple prostatectomy  00625 - [place] LAPS SURG [id number] SMPL STOT ROBOTIC ASSISTANCE      Surgeons      * Wilfredo Monet - Primary    Resident/Fellow/Other Assistant:  Surgeons and Role:     * Monica Kiran MD - Resident - Assisting    Procedure Summary  Anesthesia: General  ASA: III  Anesthesia Staff: Anesthesiologist: Eduin Harris MD  C-AA: JAMI Cantor  Estimated Blood Loss: 800 mL  Intra-op Medications:   Administrations occurring from 0730 to 1145 on 24:   Medication Name Total Dose   lactated Ringer's infusion Cannot be calculated   ceFAZolin (Ancef) 2 g in dextrose (iso)  mL 2 g              Anesthesia Record               Intraprocedure I/O Totals          Intake    LR bolus 1100.00 mL    Tranexamic Acid 0.00 mL    The total shown is the total volume documented since Anesthesia Start was filed.    lactated Ringer's infusion 2000.00 mL    Total Intake 3100 mL       Output    Est. Blood Loss 800 mL    Total Output 800 mL       Net    Net Volume 2300 mL          Specimen:   ID Type Source Tests Collected by Time   1 : PROSTATE ADENOMA Tissue PROSTATE RADICAL PROSTATECTOMY SURGICAL PATHOLOGY EXAM Wilfredo Monet MD 2024 1341        Staff:   Circulator: Luh Priestub Person: Candice Stiles Scrub: Luh Stiles Circulator: Ric Stiles Circulator: Rosemarie         Drains and/or Catheters:   Urethral Catheter Non-latex;Coude 22 Fr. (Active)   Site Assessment Clean;Skin intact 24 1421   Collection Container Standard drainage bag 24 1421   Securement Method Securing device (Describe) 24 1421   Reason for Continuing Urinary Catheterization  surgical procedures: urological/gynecological, pelvic oncology, anal, prolonged surgical procedure 09/18/24 1421   Output (mL) 450 mL 09/18/24 1421   Irrigant Normal saline 09/18/24 1421       [REMOVED] Urethral Catheter Non-latex 16 Fr. (Removed)         Implants:  Implants       Type Name Action Serial No.       22FR BETH PROSTATECTOMY STONE CATHETER 3-WAY, COUDE TIP  Wasted               Findings: hypertrophied adenoma with significantly enlarged median lobe; thickened, contracted bladder wall with small bladder capacity.  Bladder capacity roughly 50 mL.  Marked bleeding with hypervascularity of the prostate, well-controlled by the conclusion of the case    Indications: Kameron Candelaria is an 74 y.o. male who is having surgery for BPH with urinary obstruction [N40.1, N13.8]. Briefly, patient has a history of significantly enlarged prostate with a history of acute urinary retention and hematuria. He desired surgical intervention for improvement in his quality of life.     The patient was seen in the preoperative area. The risks, benefits, complications, treatment options, non-operative alternatives, expected recovery and outcomes were discussed with the patient. The possibilities of reaction to medication, pulmonary aspiration, injury to surrounding structures, bleeding, recurrent infection, the need for additional procedures, failure to diagnose a condition, and creating a complication requiring transfusion or operation were discussed with the patient. The patient concurred with the proposed plan, giving informed consent.  The site of surgery was properly noted/marked if necessary per policy. The patient has been actively warmed in preoperative area. Preoperative antibiotics have been ordered and given within 1 hours of incision. Venous thrombosis prophylaxis have been ordered including bilateral sequential compression devices    Procedure Details: The patient was taken to the operating room and positioned in  supine position. General anesthesia was induced smoothly. The patient was secured to the bed with the Pink Pad as well as chest strap.  He was then shaved, prepped, and draped in usual sterile fashion. A midline incision was made above the umbilicus with Bovie cautery, and intraperitoneal access was obtained using a Veress needle using the drop test to confirm placement.  The abdomen was insufflated with CO2 to a pressure of 15.  We placed an 8 robot port in the midline and inspected his abdomen which revealed no abnormalities. One 8mm robot port was placed on the right side of the abdomen, an additional two 8mm robot ports placed in the left with a  8 mm laparoscopic assistant port on the right side. The patient was placed in steep Trendelenburg position, and the robot was docked.   All robotic instruments were introduced under direct visualization.      We instilled 120 cc of saline into the bladder for easy identification with some leakage per urethra due to the small bladder capacity, and a vertical cystotomy was made.  We then placed two 2-0 Vicryl stitches on each side of the cystotomy inferiorly and tacked this to the peritoneum for retraction. Immediately visible was a very large median lobe with significant intravesical protrusion. The bladder wall appeared very thickened with cobblestoned appearance and had small bladder capacity. At this point we identified bilateral ureteral orifices. We used a tenaculum to place upward retraction on the prostatic adenoma.      Using a combination of electrocautery and blunt dissection we began a posterior dissection of the prostatic adenoma.  This posterior dissection was then moved laterally until we got to the lateral edges of the adenoma.  We then did match this depth anteriorly until we were circumferentially around the adenoma. This took a significantly longer time than usual given the difficulty of retraction with the patient's thickened, contracted bladder and  size of his prostate.    Once we reached the distal aspect of the adenoma we were able to cut down directly onto the catheter.  We attempted to free the adenoma completely, however the tissue was friable causing the adenoma to come out in pieces. We eventually did reach the apex of the prostate adenoma and completely enucleated the prostate circumferentially. We then used bipolar electrocautery to achieve hemostasis.      A urothelial reapproximation of the bladder neck was performed using running 3-0 V-Loc suture in a running manner from approximately 8 o'clock to 4 o'clock.      We then placed our final catheter, a 22 Fr 3-way Adenike coude catheter and inflated this with 15 ml only, as his bladder capacity was unable to accommodate more than this.     A cystotomy closure was performed in two layers, first with a 3-0 V-Loc suture bidirectionally, followed by a 2-0 V-loc in a running manner.  The bladder was filled with 60 cc with no leaks noted.  The prostate adenoma was placed in two Endocatch bags.     The supraumbilical incision was then extended slightly superiorly to allow us to remove the Endo Catch bag and all specimen was sent off to pathology.  The abdomen was desufflated and all ports were removed. We then closed the fascia of the supraumbilical port using 0 Vicryl on a UR 6 needle in a running fashion, bidirectionally. A dermis layer was then performed with 3-0 vicryl. All wounds were irrigated and local anesthetic was administered.  All incisions were closed using 4-0 Monocryl suture and Dermabond.  Continuous bladder irrigation was initiated and titrated to clear pink. This concluded the procedure and patient tolerated procedure well.     Substantially increased work, expertise, and time were necessary in this case due to the patient's markedly enlarged prostate and very small bladder.  Due to the very small volume of his bladder working space was quite limited which made navigation around the massive  prostate quite difficult. In a standard case of this kind I would usually expect this case to take roughly 3 and half hours from start to close, but in this case surgery took 6 hours.     Complications:  None; patient tolerated the procedure well.    Disposition: PACU - hemodynamically stable.  Condition: stable         Additional Details: n/a    Attending Attestation: I was present and scrubbed for the entire procedure.    Wilfredo Monet  Phone Number: [phone]

## 2024-09-18 NOTE — BRIEF OP NOTE
Date: 2024  OR Location: [facility] OR    Name: Kameron Candelaria, : 1950, Age: 74 y.o., MRN: [id number], Sex: male    Diagnosis  Pre-op Diagnosis      * BPH with urinary obstruction [N40.1, N13.8] Post-op Diagnosis     * BPH with urinary obstruction [N40.1, N13.8]     Procedures  Robotic simple prostatectomy  [id number] - [place] LAPS SURG [id number] SMPL STOT ROBOTIC ASSISTANCE      Surgeons      * Wilfredo Monet - Primary    Resident/Fellow/Other Assistant:  Surgeons and Role:  * No surgeons found with a matching role *    Procedure Summary  Anesthesia: General  ASA: III  Anesthesia Staff: Anesthesiologist: Eduin Harris MD  C-AA: JAMI Cantor  Estimated Blood Loss: 800 mL  Intra-op Medications:   Administrations occurring from 0730 to 1145 on 24:   Medication Name Total Dose   lactated Ringer's infusion Cannot be calculated   ceFAZolin (Ancef) 2 g in dextrose (iso)  mL 2 g              Anesthesia Record               Intraprocedure I/O Totals          Intake    LR bolus 1100.00 mL    Tranexamic Acid 0.00 mL    The total shown is the total volume documented since Anesthesia Start was filed.    lactated Ringer's infusion 2000.00 mL    Total Intake 3100 mL       Output    Est. Blood Loss 800 mL    Total Output 800 mL       Net    Net Volume 2300 mL          Specimen:   ID Type Source Tests Collected by Time   1 : PROSTATE ADENOMA Tissue PROSTATE RADICAL PROSTATECTOMY SURGICAL PATHOLOGY EXAM Wilfredo Monet MD 2024 1341        Staff:   Circulator: Luh Robertson Person: Candice Stiles Scrub: Luh Stiles Circulator: Ric Stiles Circulator: Rosemarie          Findings: hypertrophied adenoma with significantly enlarged median lobe; thickened, contracted bladder wall with small bladder capacity    Complications:  None; patient tolerated the procedure well.     Disposition: PACU - hemodynamically stable.  Condition: stable  Specimens Collected:   ID Type Source Tests Collected by Time   1 :  PROSTATE ADENOMA Tissue PROSTATE RADICAL PROSTATECTOMY SURGICAL PATHOLOGY EXAM Wilfredo Monet MD 9/18/2024 1341     Attending Attestation: I was present and scrubbed for the entire procedure.    Wilfredo Monet  Phone Number: [phone]

## 2024-09-18 NOTE — CARE PLAN
The patient's goals for the shift include      The clinical goals for the shift include to monitor urine out put    Pt arrived to the unit from the pacu room 4125. Family at bedside    CBI in place

## 2024-09-19 VITALS
SYSTOLIC BLOOD PRESSURE: 120 MMHG | BODY MASS INDEX: 38.57 KG/M2 | OXYGEN SATURATION: 92 % | TEMPERATURE: 97.3 F | RESPIRATION RATE: 17 BRPM | HEART RATE: 91 BPM | WEIGHT: 240 LBS | DIASTOLIC BLOOD PRESSURE: 64 MMHG | HEIGHT: 66 IN

## 2024-09-19 DIAGNOSIS — N40.1 BPH WITH OBSTRUCTION/LOWER URINARY TRACT SYMPTOMS: ICD-10-CM

## 2024-09-19 DIAGNOSIS — E78.5 HYPERLIPIDEMIA, UNSPECIFIED HYPERLIPIDEMIA TYPE: ICD-10-CM

## 2024-09-19 DIAGNOSIS — N13.8 BPH WITH OBSTRUCTION/LOWER URINARY TRACT SYMPTOMS: ICD-10-CM

## 2024-09-19 DIAGNOSIS — E11.9 TYPE 2 DIABETES MELLITUS WITHOUT COMPLICATION, WITHOUT LONG-TERM CURRENT USE OF INSULIN (MULTI): ICD-10-CM

## 2024-09-19 LAB
ANION GAP SERPL CALC-SCNC: 15 MMOL/L (ref 10–20)
BUN SERPL-MCNC: 25 MG/DL (ref 6–23)
CALCIUM SERPL-MCNC: 8.2 MG/DL (ref 8.6–10.3)
CHLORIDE SERPL-SCNC: 98 MMOL/L (ref 98–107)
CO2 SERPL-SCNC: 24 MMOL/L (ref 21–32)
CREAT SERPL-MCNC: 1.71 MG/DL (ref 0.5–1.3)
EGFRCR SERPLBLD CKD-EPI 2021: 41 ML/MIN/1.73M*2
ERYTHROCYTE [DISTWIDTH] IN BLOOD BY AUTOMATED COUNT: 12.4 % (ref 11.5–14.5)
GLUCOSE BLD MANUAL STRIP-MCNC: 278 MG/DL (ref 74–99)
GLUCOSE BLD MANUAL STRIP-MCNC: 291 MG/DL (ref 74–99)
GLUCOSE SERPL-MCNC: 302 MG/DL (ref 74–99)
HCT VFR BLD AUTO: 28.6 % (ref 41–52)
HGB BLD-MCNC: 9.5 G/DL (ref 13.5–17.5)
MCH RBC QN AUTO: 28.5 PG (ref 26–34)
MCHC RBC AUTO-ENTMCNC: 33.2 G/DL (ref 32–36)
MCV RBC AUTO: 86 FL (ref 80–100)
NRBC BLD-RTO: 0 /100 WBCS (ref 0–0)
PLATELET # BLD AUTO: 243 X10*3/UL (ref 150–450)
POTASSIUM SERPL-SCNC: 4.6 MMOL/L (ref 3.5–5.3)
RBC # BLD AUTO: 3.33 X10*6/UL (ref 4.5–5.9)
SODIUM SERPL-SCNC: 132 MMOL/L (ref 136–145)
WBC # BLD AUTO: 15.4 X10*3/UL (ref 4.4–11.3)

## 2024-09-19 PROCEDURE — 36415 COLL VENOUS BLD VENIPUNCTURE: CPT | Performed by: STUDENT IN AN ORGANIZED HEALTH CARE EDUCATION/TRAINING PROGRAM

## 2024-09-19 PROCEDURE — 2500000001 HC RX 250 WO HCPCS SELF ADMINISTERED DRUGS (ALT 637 FOR MEDICARE OP): Performed by: STUDENT IN AN ORGANIZED HEALTH CARE EDUCATION/TRAINING PROGRAM

## 2024-09-19 PROCEDURE — 2500000004 HC RX 250 GENERAL PHARMACY W/ HCPCS (ALT 636 FOR OP/ED): Performed by: STUDENT IN AN ORGANIZED HEALTH CARE EDUCATION/TRAINING PROGRAM

## 2024-09-19 PROCEDURE — 82947 ASSAY GLUCOSE BLOOD QUANT: CPT | Mod: 59

## 2024-09-19 PROCEDURE — 80048 BASIC METABOLIC PNL TOTAL CA: CPT | Performed by: STUDENT IN AN ORGANIZED HEALTH CARE EDUCATION/TRAINING PROGRAM

## 2024-09-19 PROCEDURE — 7100000011 HC EXTENDED STAY RECOVERY HOURLY - NURSING UNIT

## 2024-09-19 PROCEDURE — 2500000005 HC RX 250 GENERAL PHARMACY W/O HCPCS: Performed by: STUDENT IN AN ORGANIZED HEALTH CARE EDUCATION/TRAINING PROGRAM

## 2024-09-19 PROCEDURE — 99239 HOSP IP/OBS DSCHRG MGMT >30: CPT | Performed by: NURSE PRACTITIONER

## 2024-09-19 PROCEDURE — 85027 COMPLETE CBC AUTOMATED: CPT | Performed by: STUDENT IN AN ORGANIZED HEALTH CARE EDUCATION/TRAINING PROGRAM

## 2024-09-19 PROCEDURE — 2500000002 HC RX 250 W HCPCS SELF ADMINISTERED DRUGS (ALT 637 FOR MEDICARE OP, ALT 636 FOR OP/ED): Performed by: STUDENT IN AN ORGANIZED HEALTH CARE EDUCATION/TRAINING PROGRAM

## 2024-09-19 RX ORDER — OXYCODONE HYDROCHLORIDE 5 MG/1
5 TABLET ORAL EVERY 6 HOURS PRN
Qty: 5 TABLET | Refills: 0 | Status: SHIPPED | OUTPATIENT
Start: 2024-09-19 | End: 2024-09-21

## 2024-09-19 RX ORDER — POLYETHYLENE GLYCOL 3350 17 G/17G
17 POWDER, FOR SOLUTION ORAL DAILY
Qty: 7 PACKET | Refills: 0 | Status: SHIPPED | OUTPATIENT
Start: 2024-09-20 | End: 2024-09-27

## 2024-09-19 RX ORDER — SENNOSIDES 8.6 MG/1
2 TABLET ORAL 2 TIMES DAILY PRN
Qty: 14 TABLET | Refills: 0 | Status: SHIPPED | OUTPATIENT
Start: 2024-09-19 | End: 2024-09-26

## 2024-09-19 RX ORDER — CIPROFLOXACIN 500 MG/1
500 TABLET ORAL 2 TIMES DAILY
Qty: 6 TABLET | Refills: 0 | Status: SHIPPED | OUTPATIENT
Start: 2024-09-19 | End: 2024-09-22

## 2024-09-19 RX ORDER — OXYBUTYNIN CHLORIDE 5 MG/1
5 TABLET ORAL 3 TIMES DAILY
Qty: 21 TABLET | Refills: 0 | Status: SHIPPED | OUTPATIENT
Start: 2024-09-19 | End: 2024-09-26

## 2024-09-19 RX ADMIN — OXYCODONE HYDROCHLORIDE 10 MG: 10 TABLET ORAL at 09:15

## 2024-09-19 RX ADMIN — OXYBUTYNIN CHLORIDE 5 MG: 5 TABLET ORAL at 09:17

## 2024-09-19 RX ADMIN — SENNOSIDES 17.2 MG: 8.6 TABLET, FILM COATED ORAL at 09:17

## 2024-09-19 RX ADMIN — ACETAMINOPHEN 975 MG: 325 TABLET ORAL at 05:05

## 2024-09-19 RX ADMIN — INSULIN LISPRO 3 UNITS: 100 INJECTION, SOLUTION INTRAVENOUS; SUBCUTANEOUS at 09:16

## 2024-09-19 RX ADMIN — ACETAMINOPHEN 975 MG: 325 TABLET ORAL at 11:07

## 2024-09-19 RX ADMIN — LIDOCAINE 4% 1 PATCH: 40 PATCH TOPICAL at 09:16

## 2024-09-19 RX ADMIN — AMLODIPINE BESYLATE 5 MG: 5 TABLET ORAL at 09:16

## 2024-09-19 RX ADMIN — METHOCARBAMOL TABLETS 500 MG: 500 TABLET, COATED ORAL at 13:42

## 2024-09-19 RX ADMIN — METHOCARBAMOL TABLETS 500 MG: 500 TABLET, COATED ORAL at 05:05

## 2024-09-19 RX ADMIN — SIMETHICONE 160 MG: 80 TABLET, CHEWABLE ORAL at 13:42

## 2024-09-19 RX ADMIN — POLYETHYLENE GLYCOL 3350 17 G: 17 POWDER, FOR SOLUTION ORAL at 09:16

## 2024-09-19 RX ADMIN — OXYCODONE HYDROCHLORIDE 5 MG: 5 TABLET ORAL at 03:16

## 2024-09-19 RX ADMIN — SIMETHICONE 160 MG: 80 TABLET, CHEWABLE ORAL at 09:17

## 2024-09-19 ASSESSMENT — PAIN SCALES - GENERAL
PAINLEVEL_OUTOF10: 9
PAINLEVEL_OUTOF10: 4
PAINLEVEL_OUTOF10: 0 - NO PAIN
PAINLEVEL_OUTOF10: 5 - MODERATE PAIN
PAINLEVEL_OUTOF10: 4

## 2024-09-19 ASSESSMENT — PAIN - FUNCTIONAL ASSESSMENT
PAIN_FUNCTIONAL_ASSESSMENT: 0-10
PAIN_FUNCTIONAL_ASSESSMENT: 0-10

## 2024-09-19 ASSESSMENT — PAIN DESCRIPTION - LOCATION: LOCATION: PELVIS

## 2024-09-19 NOTE — CARE PLAN
Problem: Skin  Goal: Decreased wound size/increased tissue granulation at next dressing change  Outcome: Progressing  Goal: Participates in plan/prevention/treatment measures  Outcome: Progressing     Problem: Pain - Adult  Goal: Verbalizes/displays adequate comfort level or baseline comfort level  Outcome: Progressing     Problem: Safety - Adult  Goal: Free from fall injury  Outcome: Progressing     Problem: Discharge Planning  Goal: Discharge to home or other facility with appropriate resources  Outcome: Progressing     Problem: Chronic Conditions and Co-morbidities  Goal: Patient's chronic conditions and co-morbidity symptoms are monitored and maintained or improved  Outcome: Progressing   The patient's goals for the shift include  pain control    The clinical goals for the shift include monitor CBI

## 2024-09-19 NOTE — CARE PLAN
Problem: Pain  Goal: Takes deep breaths with improved pain control throughout the shift  Outcome: Progressing    Problem: Pain  Goal: Turns in bed with improved pain control throughout the shift  Outcome: Progressing     Problem: Pain  Goal: Walks with improved pain control throughout the shift  Outcome: Progressing     Problem: Pain  Goal: Performs ADL's with improved pain control throughout shift  Outcome: Progressing

## 2024-09-19 NOTE — PROGRESS NOTES
09/19/24 0947   Discharge Planning   Living Arrangements Spouse/significant other   Support Systems Spouse/significant other   Type of Residence Private residence   Home or Post Acute Services None   Expected Discharge Disposition Home   Does the patient need discharge transport arranged? No     Met with patient at bedside. Admitted for prostatectomy. Pt lives with spouse and was independent PTA with no HHC or DME. PCP is Sebastian Connelly. Pt feels he is able to manage his health and understands his medications. Was able to drive and obtain meds. Spouse can also provide transportation. Pt plans to return home with no new discharge needs. Family will provide transport home.

## 2024-09-19 NOTE — PROGRESS NOTES
Physical Therapy                       Therapy Communication Note    Patient Name: Kameron Candelaria  MRN: [id number]  Today's Date: 9/19/2024     Discipline: Physical Therapy    Missed Visit Reason: PT order received, chart reviewed. Attempted pt at 1202. Pt with CBI at this time. Will reattempt PT evaluation as appropriate.     Missed Time: Attempt

## 2024-09-19 NOTE — PROGRESS NOTES
Kameron Candelaria 74 y.o. male    Subjective  Patient seen and examined this morning.  Denies nausea and vomiting. No fever or chills.  CBI at slow drip rate with zarate catheter with urine clear, no clots.  Pain controlled, does report having some bladder spasms.  Passing flatus, no bowel movement.  Has not been out of bed yet.  No acute events overnight.     Objective  PHYSICAL EXAM:  Physical Exam  Vitals reviewed.   Constitutional:       General: He is awake.      Appearance: Normal appearance.   Cardiovascular:      Rate and Rhythm: Normal rate and regular rhythm.      Pulses: Normal pulses.      Heart sounds: Normal heart sounds.   Pulmonary:      Effort: Pulmonary effort is normal.      Breath sounds: Normal breath sounds and air entry.   Abdominal:      General: Abdomen is flat. There is no distension.      Palpations: Abdomen is soft.      Tenderness: There is abdominal tenderness. There is no guarding or rebound.      Comments: Soft, obese abdomen.  Incisions well approximated with glue, C/D/I. Appropriately TTP.    Genitourinary:     Comments: 3 way zarate catheter with CBI now clamped, urine clear with no clots.   Musculoskeletal:         General: Normal range of motion.      Cervical back: Normal range of motion.   Skin:     General: Skin is warm and dry.   Neurological:      General: No focal deficit present.      Mental Status: He is alert and oriented to person, place, and time.   Psychiatric:         Behavior: Behavior is cooperative.         Vital signs in last 24 hours:  Vitals:    09/19/24 0915   BP: 136/56   Pulse: 74   Resp: 16   Temp: 36.4 °C (97.5 °F)   SpO2: 96%         Intake/Output this shift:    Intake/Output Summary (Last 24 hours) at 9/19/2024 1117  Last data filed at 9/19/2024 0800  Gross per 24 hour   Intake 3013.75 ml   Output 6825 ml   Net -3811.25 ml        Allergies:  No Known Allergies     Medications:  Scheduled medications  acetaminophen, 975 mg, oral, q6h  amLODIPine, 5 mg, oral,  Daily  atorvastatin, 10 mg, oral, Nightly  insulin lispro, 0-5 Units, subcutaneous, TID  lidocaine, 1 patch, transdermal, Daily  methocarbamol, 500 mg, oral, q8h ATTILA  oxybutynin, 5 mg, oral, TID  polyethylene glycol, 17 g, oral, Daily  sennosides, 2 tablet, oral, BID  simethicone, 160 mg, oral, TID after meals      Continuous medications     PRN medications  PRN medications: bisacodyl, HYDROmorphone, melatonin, naloxone, naloxone, naloxone, ondansetron ODT **OR** ondansetron, oxyCODONE, oxyCODONE       Labs:  Results for orders placed or performed during the hospital encounter of 09/18/24 (from the past 24 hour(s))   POCT GLUCOSE   Result Value Ref Range    POCT Glucose 315 (H) 74 - 99 mg/dL   POCT GLUCOSE   Result Value Ref Range    POCT Glucose 316 (H) 74 - 99 mg/dL   CBC   Result Value Ref Range    WBC 15.4 (H) 4.4 - 11.3 x10*3/uL    nRBC 0.0 0.0 - 0.0 /100 WBCs    RBC 3.33 (L) 4.50 - 5.90 x10*6/uL    Hemoglobin 9.5 (L) 13.5 - 17.5 g/dL    Hematocrit 28.6 (L) 41.0 - 52.0 %    MCV 86 80 - 100 fL    MCH 28.5 26.0 - 34.0 pg    MCHC 33.2 32.0 - 36.0 g/dL    RDW 12.4 11.5 - 14.5 %    Platelets 243 150 - 450 x10*3/uL   Basic metabolic panel   Result Value Ref Range    Glucose 302 (H) 74 - 99 mg/dL    Sodium 132 (L) 136 - 145 mmol/L    Potassium 4.6 3.5 - 5.3 mmol/L    Chloride 98 98 - 107 mmol/L    Bicarbonate 24 21 - 32 mmol/L    Anion Gap 15 10 - 20 mmol/L    Urea Nitrogen 25 (H) 6 - 23 mg/dL    Creatinine 1.71 (H) 0.50 - 1.30 mg/dL    eGFR 41 (L) >60 mL/min/1.73m*2    Calcium 8.2 (L) 8.6 - 10.3 mg/dL   POCT GLUCOSE   Result Value Ref Range    POCT Glucose 291 (H) 74 - 99 mg/dL        Imaging:  No results found.     Plan  BPH with urinary obstruction     POD#1:  S/p Robotic simple prostatectomy     - surgery as above  - avss  - Diet: diabetic   - Pain control  - Nausea: antiemetics PRN  - Encourage OOB and IS  - Hold DVT prophylaxis d/t increased risk for bleeding   - Maintain zarate catheter. Will plan to discontinue  CBI today.   - DC IVF  - Plan to send patient home on 3d course Cipro to start day prior to zarate catheter removal   - Daily labs  - Bowel regimen     #MARY  - BUN/Scr 25/1.71   - Creatinine 0.94 on 9/4/2024  - renally dose medications  - Avoid nephrotoxins  - Encourage fluid intake     #Diabetes Mellitus  - Accuchecks with sliding scale coverage    #Bladder spasms  - Oxybutynin started     #HLD  - Home atorvastatin resumed    #HTN  - Home amlodipine resumed     Plan of care discussed and patient seen with Dr. Monet and patient may discharge home. Home medications and prescriptions as discussed.  Follow up as scheduled.     LUKE Chun-CNP    I spent 45 minutes in the professional and overall care of this patient.

## 2024-09-19 NOTE — DISCHARGE INSTRUCTIONS
DEPARTMENT OF Urology  DISCHARGE INSTRUCTIONS -- Robotic Prostatectomy  Inpatient Surgery    C O N F I D E N T I A L   I N F O R M A T I O N    Kameron Candelaria      Call [phone] during regular daytime business hours (8:00 am - 5:00 pm) and after 5:00 pm and ask for the Urology resident with any questions or concerns.    If it is a life-threatening situation, proceed to the nearest emergency department.        Follow-up appointment:  Friday, 9/27/24 @ 8AM      Thank you for the opportunity to care for you today.  Your health and healing are very important to us.  We hope we made you feel as comfortable as possible and are committed to your recovery and continued well-being.      The following is a brief overview of your robotic prostatectomy procedure. Some of the information contained on this summary may be confidential.  This information should be kept in your records and should be shared with your regular doctor.    Physicians:   Dr. Monet     Procedure performed: removal of your prostate      What to Expect During your Recovery and Home Care  Anesthesia Side Effects   You may feel sleepy, tired, or have a sore throat.   You may also feel drowsiness, dizziness, or inability to think clearly.  For your safety, do not drive, drink alcoholic beverages, take any unprescribed medication or make any important decisions for 24 hours after anesthesia.  A responsible adult should be with you for 24 hours.        Activity and Recovery    No heavy lifting greater than 10 pounds for the next 4-6 weeks. No driving until mobility has returned to normal. Do not drive or operate heavy machinery while taking narcotic pain medications as these medications can alter perception, impair judgement, and slow reaction times.  Pain Control  Unfortunately, you may experience pain after your procedure. Adequate pain management can include alternative measures to help ease your pain and that can include over the counter  Tylenol/ibuprofen or *** which can be taken as prescribed as needed for breakthrough pain. Do not take more than 4,000mg of Tylenol in a 24-hour period.      Your procedure was done robotically so you may experience gas pains from the surgery. Getting up and moving around is the best way to relieve those pains. You can also take some over the counter gas-x(simethicone).    Nausea/Vomiting   Clear liquids are best tolerated at first. Start slow, advance your diet as tolerated to normal foods. Avoid spicy, greasy, heavy foods at first. Also, you may feel nauseous or like you need to vomit if you take any type of medication on an empty stomach.  Call your physician if you are unable to eat or drink, are not passing gas and have persistent vomiting.    Signs of Bleeding   You could have some blood in your urine off and on over the next several weeks. Your urine will be light pink to yellow. Minor bleeding or drainage may occur from the surgical sites; however, excessive or consistent bleeding should be reported to your surgeon. Excessive bleeding is defined as blood that is dripping from wound, soaking you bandages, and is ketchup colored, thick with possible blood clots.  Consistent is defined as bleeding that does not stop.      Treatment/wound care:   Keep area(s) clean and dry.   It is okay to shower 24 hours after time of surgery.    Do not scrub wound(s), pat dry.    Do not submerge wound(s) in standing water until seen for follow up appointment (no tub bathing, swimming, or hot tubs).    Clean with mild soap, gentle washing, pat dry, cover with bandage as needed.    Avoid waterproof bandages.  No oils or lotions on incisions.  Staples/sutures removed in our [id number] days after the date of surgery.  Do not remove the staples/sutures on your own.    Please visually inspect your wound(s) at least once daily.  If the wound(s) are in a difficult to see location, please use a mirror or have someone else assist with  visual inspection.    Signs of Infection  Signs of infection can include fever, chills, redness around surgical incisions, green/yellow drainage from incisions, or severe abdominal pain.  If you see any of these occur, please contact your doctor's office at [phone].  Any fever higher than 100.4, especially if associated with an ill feeling, abdominal pain, chills, or nausea should be reported to your surgeon.      Assist in bowel movements/urination  Take daily stool softener you were prescribed  Increase fiber in diet  Increase water (6 to 8 glasses)  Increase walking   Can try adding over the counter MiraLAX or in extreme cases milk of magnesia.  If you have tried these methods and you are not passing gas, your bladder still feels full and you cannot have a bowel movement, please go to your nearest Emergency room/contact your physician.    Additional Instructions:   Use a small pillow to put pressure on your belly. This can make you more comfortable when you cough, laugh or do other actions.     CATHETER CARE  Always keep the catheters tubing and drainage bag below the level of your bladder.  Avoid loops and kinks in the catheter tubing.  NOTIFY your physician if catheter falls out or catheter seems clogged and urine is not draining.   Do not wear the small leg bag to bed you should be provided with a larger overnight bag that you should wear to bed and can hang over the side of the bed.  We recommend wearing the large bag in the shower as well as this is easy to dry, and you do not get your leg straps wet from your leg bag.   Your catheter should be secured to your upper thigh, do not allow it to hang or dangle.  You will have a Fluoroscopy study right before your post-operative appointment, which will determine if there are any leaks and your catheter can come out that day.

## 2024-09-23 RX ORDER — ATORVASTATIN CALCIUM 10 MG/1
10 TABLET, FILM COATED ORAL DAILY
Qty: 90 TABLET | Refills: 0 | Status: SHIPPED | OUTPATIENT
Start: 2024-09-23

## 2024-09-23 RX ORDER — GLIMEPIRIDE 2 MG/1
2 TABLET ORAL DAILY
Qty: 90 TABLET | Refills: 1 | Status: SHIPPED | OUTPATIENT
Start: 2024-09-23

## 2024-09-23 RX ORDER — TAMSULOSIN HYDROCHLORIDE 0.4 MG/1
0.4 CAPSULE ORAL DAILY
Qty: 90 CAPSULE | Refills: 0 | Status: SHIPPED | OUTPATIENT
Start: 2024-09-23 | End: 2024-12-22

## 2024-09-23 RX ORDER — METFORMIN HYDROCHLORIDE 500 MG/1
1000 TABLET ORAL 2 TIMES DAILY
Qty: 360 TABLET | Refills: 1 | Status: SHIPPED | OUTPATIENT
Start: 2024-09-23

## 2024-09-25 NOTE — DISCHARGE SUMMARY
Discharge Diagnosis  BPH with urinary obstruction    Issues Requiring Follow-Up  Catheter removal    Test Results Pending At Discharge  Pending Labs       Order Current Status    Surgical Pathology Exam In process            Hospital Course   Patient was admitted following a robotic simple prostatectomy.  On postoperative day 1, patient was convalescing appropriately.  He was ambulating, tolerating a diet, labs and vitals were reassuring and patient was deemed fit for discharge to home    Pertinent Physical Exam At Time of Discharge  Physical Exam    Home Medications     Medication List      START taking these medications     oxybutynin 5 mg tablet; Commonly known as: Ditropan; Take 1 tablet (5   mg) by mouth 3 times a day for 7 days.   polyethylene glycol 17 gram packet; Commonly known as: Glycolax,   Miralax; Take 17 g by mouth once daily for 7 days.   sennosides 8.6 mg tablet; Commonly known as: Senokot; Take 2 tablets   (17.2 mg) by mouth 2 times a day as needed for constipation for up to 7   days.     CONTINUE taking these medications     amLODIPine 5 mg tablet; Commonly known as: Norvasc; Take 1 tablet (5 mg)   by mouth once daily.   lancets 33 gauge misc; 1 Lancet once daily. May provide what insurance   will cover.   OneTouch Ultra Test strip; Generic drug: blood sugar diagnostic; 1 strip   by Does not apply route once daily.   TylenoL 325 mg tablet; Generic drug: acetaminophen     STOP taking these medications     cephalexin 500 mg capsule; Commonly known as: Keflex     ASK your doctor about these medications     ciprofloxacin 500 mg tablet; Commonly known as: Cipro; Take 1 tablet   (500 mg) by mouth 2 times a day for 3 days. To start day prior to zarate   catheter removal; Ask about: Should I take this medication?   oxyCODONE 5 mg immediate release tablet; Commonly known as: Roxicodone;   Take 1 tablet (5 mg) by mouth every 6 hours if needed for severe pain (7 -   10) for up to 2 days.; Ask about: Should I  take this medication?       Outpatient Follow-Up  Future Appointments   Date Time Provider Department Center   9/27/2024  8:00 AM UROLOGY [id number] NURSE [id number] [place]       Wilfredo Monet MD

## 2024-09-27 ENCOUNTER — APPOINTMENT (OUTPATIENT)
Dept: UROLOGY | Facility: CLINIC | Age: 74
End: 2024-09-27
Payer: COMMERCIAL

## 2024-09-27 VITALS
BODY MASS INDEX: 38.35 KG/M2 | WEIGHT: 237.6 LBS | DIASTOLIC BLOOD PRESSURE: 73 MMHG | HEART RATE: 96 BPM | SYSTOLIC BLOOD PRESSURE: 126 MMHG | TEMPERATURE: 97.8 F

## 2024-09-27 DIAGNOSIS — N40.1 BENIGN PROSTATIC HYPERPLASIA WITH URINARY RETENTION: ICD-10-CM

## 2024-09-27 DIAGNOSIS — R33.8 BENIGN PROSTATIC HYPERPLASIA WITH URINARY RETENTION: ICD-10-CM

## 2024-09-27 PROCEDURE — 51700 IRRIGATION OF BLADDER: CPT | Performed by: UROLOGY

## 2024-09-27 NOTE — PROGRESS NOTES
Pt presented for trial of void and catheter removal. Pt tolerated trial of void well. Pt tolerated catheter removal well. Advised pt of normal/abnormal signs and symptoms of urinary retention and when to contact provider or ER. Pt stated understanding.

## 2024-10-01 LAB
LABORATORY COMMENT REPORT: NORMAL
PATH REPORT.FINAL DX SPEC: NORMAL
PATH REPORT.GROSS SPEC: NORMAL
PATH REPORT.RELEVANT HX SPEC: NORMAL
PATH REPORT.TOTAL CANCER: NORMAL

## 2024-10-03 ENCOUNTER — PATIENT OUTREACH (OUTPATIENT)
Dept: PRIMARY CARE | Facility: CLINIC | Age: 74
End: 2024-10-03
Payer: COMMERCIAL

## 2024-10-03 NOTE — PROGRESS NOTES
Unable to reach patient for discharge follow up call.   LVM with call back number for patient to call if needed the patient to assist with any questions or concerns patient may have.

## 2024-10-14 ENCOUNTER — OFFICE VISIT (OUTPATIENT)
Dept: PRIMARY CARE | Facility: CLINIC | Age: 74
End: 2024-10-14
Payer: COMMERCIAL

## 2024-10-14 ENCOUNTER — LAB (OUTPATIENT)
Dept: LAB | Facility: LAB | Age: 74
End: 2024-10-14
Payer: COMMERCIAL

## 2024-10-14 VITALS
SYSTOLIC BLOOD PRESSURE: 121 MMHG | HEIGHT: 66 IN | DIASTOLIC BLOOD PRESSURE: 76 MMHG | WEIGHT: 243.2 LBS | HEART RATE: 76 BPM | BODY MASS INDEX: 39.08 KG/M2

## 2024-10-14 DIAGNOSIS — R30.0 DYSURIA: Primary | ICD-10-CM

## 2024-10-14 DIAGNOSIS — K59.00 CONSTIPATION, UNSPECIFIED CONSTIPATION TYPE: ICD-10-CM

## 2024-10-14 DIAGNOSIS — Z23 NEED FOR INFLUENZA VACCINATION: ICD-10-CM

## 2024-10-14 DIAGNOSIS — I10 PRIMARY HYPERTENSION: ICD-10-CM

## 2024-10-14 DIAGNOSIS — Z90.79 HX OF PROSTATECTOMY: ICD-10-CM

## 2024-10-14 DIAGNOSIS — R39.9 UTI SYMPTOMS: ICD-10-CM

## 2024-10-14 DIAGNOSIS — E11.9 TYPE 2 DIABETES MELLITUS WITHOUT COMPLICATION, WITHOUT LONG-TERM CURRENT USE OF INSULIN (MULTI): ICD-10-CM

## 2024-10-14 DIAGNOSIS — E78.5 HYPERLIPIDEMIA, UNSPECIFIED HYPERLIPIDEMIA TYPE: ICD-10-CM

## 2024-10-14 LAB
APPEARANCE UR: ABNORMAL
BACTERIA #/AREA URNS AUTO: ABNORMAL /HPF
BILIRUB UR STRIP.AUTO-MCNC: NEGATIVE MG/DL
COLOR UR: YELLOW
GLUCOSE UR STRIP.AUTO-MCNC: NORMAL MG/DL
HOLD SPECIMEN: NORMAL
KETONES UR STRIP.AUTO-MCNC: NEGATIVE MG/DL
LEUKOCYTE ESTERASE UR QL STRIP.AUTO: ABNORMAL
NITRITE UR QL STRIP.AUTO: ABNORMAL
PH UR STRIP.AUTO: 5.5 [PH]
PROT UR STRIP.AUTO-MCNC: ABNORMAL MG/DL
RBC # UR STRIP.AUTO: ABNORMAL /UL
RBC #/AREA URNS AUTO: >20 /HPF
SP GR UR STRIP.AUTO: 1.02
UROBILINOGEN UR STRIP.AUTO-MCNC: NORMAL MG/DL
WBC #/AREA URNS AUTO: >50 /HPF

## 2024-10-14 PROCEDURE — 3078F DIAST BP <80 MM HG: CPT | Performed by: INTERNAL MEDICINE

## 2024-10-14 PROCEDURE — 3074F SYST BP LT 130 MM HG: CPT | Performed by: INTERNAL MEDICINE

## 2024-10-14 PROCEDURE — G0008 ADMIN INFLUENZA VIRUS VAC: HCPCS | Performed by: INTERNAL MEDICINE

## 2024-10-14 PROCEDURE — 3051F HG A1C>EQUAL 7.0%<8.0%: CPT | Performed by: INTERNAL MEDICINE

## 2024-10-14 PROCEDURE — 99214 OFFICE O/P EST MOD 30 MIN: CPT | Performed by: INTERNAL MEDICINE

## 2024-10-14 PROCEDURE — 87186 SC STD MICRODIL/AGAR DIL: CPT

## 2024-10-14 PROCEDURE — 1159F MED LIST DOCD IN RCRD: CPT | Performed by: INTERNAL MEDICINE

## 2024-10-14 PROCEDURE — 3008F BODY MASS INDEX DOCD: CPT | Performed by: INTERNAL MEDICINE

## 2024-10-14 PROCEDURE — 81001 URINALYSIS AUTO W/SCOPE: CPT

## 2024-10-14 PROCEDURE — 3048F LDL-C <100 MG/DL: CPT | Performed by: INTERNAL MEDICINE

## 2024-10-14 PROCEDURE — 1036F TOBACCO NON-USER: CPT | Performed by: INTERNAL MEDICINE

## 2024-10-14 PROCEDURE — 90662 IIV NO PRSV INCREASED AG IM: CPT | Performed by: INTERNAL MEDICINE

## 2024-10-14 PROCEDURE — 87086 URINE CULTURE/COLONY COUNT: CPT

## 2024-10-14 PROCEDURE — 1123F ACP DISCUSS/DSCN MKR DOCD: CPT | Performed by: INTERNAL MEDICINE

## 2024-10-14 RX ORDER — DOCUSATE SODIUM 100 MG/1
100 CAPSULE, LIQUID FILLED ORAL 2 TIMES DAILY
Qty: 60 CAPSULE | Refills: 0 | Status: SHIPPED | OUTPATIENT
Start: 2024-10-14

## 2024-10-14 ASSESSMENT — PATIENT HEALTH QUESTIONNAIRE - PHQ9: 1. LITTLE INTEREST OR PLEASURE IN DOING THINGS: NOT AT ALL

## 2024-10-14 NOTE — PROGRESS NOTES
Assessment/Plan   Problem List Items Addressed This Visit       Diabetes mellitus (Multi)    Hyperlipidemia    Hypertension    Need for influenza vaccination    Relevant Orders    Flu vaccine, trivalent, preservative free, HIGH-DOSE, age 65y+ (Fluzone)    Dysuria - Primary    Constipation    Relevant Medications    docusate sodium (Colace) 100 mg capsule    Hx of prostatectomy     We did not make any changes in the treatment all these conditions were discussed  I also advised to wait to get the response from the urologist for any specific treatment if it is considered presumably by tomorrow the day after tomorrow when the urine culture report will be available  It is a postop situation and did not want to give antibiotic at this point  Subjective   Patient ID: Kameron Candelaria is a 74 y.o. male who presents for Follow-up (Possible kidney infection.).    Past Surgical History:   Procedure Laterality Date    OTHER SURGICAL HISTORY  02/06/2022    Colonoscopy    OTHER SURGICAL HISTORY  01/25/2022    Hand surgery    PROSTATE SURGERY        Family History   Problem Relation Name Age of Onset    Diabetes Mother      No Known Problems Father        Social History     Socioeconomic History    Marital status:      Spouse name: Not on file    Number of children: Not on file    Years of education: Not on file    Highest education level: Not on file   Occupational History    Not on file   Tobacco Use    Smoking status: Never    Smokeless tobacco: Never   Substance and Sexual Activity    Alcohol use: Not Currently     Alcohol/week: 2.0 - 3.0 standard drinks of alcohol     Types: 2 - 3 Cans of beer per week    Drug use: Never    Sexual activity: Not Currently   Other Topics Concern    Not on file   Social History Narrative    Not on file     Social Determinants of Health     Financial Resource Strain: Low Risk  (9/18/2024)    Overall Financial Resource Strain (CARDIA)     Difficulty of Paying Living Expenses: Not hard at all  "  Food Insecurity: Not on file   Transportation Needs: No Transportation Needs (9/18/2024)    PRAPARE - Transportation     Lack of Transportation (Medical): No     Lack of Transportation (Non-Medical): No   Physical Activity: Not on file   Stress: Not on file   Social Connections: Not on file   Intimate Partner Violence: Not on file   Housing Stability: Low Risk  (9/18/2024)    Housing Stability Vital Sign     Unable to Pay for Housing in the Last Year: No     Number of Times Moved in the Last Year: 1     Homeless in the Last Year: No      Patient has no known allergies.   Current Outpatient Medications   Medication Sig Dispense Refill    acetaminophen (TylenoL) 325 mg tablet Take 2 tablets (650 mg) by mouth every 8 hours if needed for moderate pain (4 - 6).      amLODIPine (Norvasc) 5 mg tablet Take 1 tablet (5 mg) by mouth once daily. 30 tablet 0    atorvastatin (Lipitor) 10 mg tablet TAKE ONE TABLET BY MOUTH DAILY AS DIRECTED 90 tablet 0    glimepiride (Amaryl) 2 mg tablet TAKE 1 TABLET BY MOUTH once DAILY 90 tablet 1    lancets 33 gauge misc 1 Lancet once daily. May provide what insurance will cover. 100 each 1    metFORMIN (Glucophage) 500 mg tablet TAKE TWO TABLETS BY MOUTH TWO TIMES A  tablet 1    OneTouch Ultra Test strip 1 strip by Does not apply route once daily. 100 each 1    tamsulosin (Flomax) 0.4 mg 24 hr capsule take 1 capsule (0.4 mg) by mouth once daily 90 capsule 0    docusate sodium (Colace) 100 mg capsule Take 1 capsule (100 mg) by mouth 2 times a day. 60 capsule 0     No current facility-administered medications for this visit.      Vitals:    10/14/24 1124   BP: 121/76   BP Location: Left arm   Patient Position: Sitting   BP Cuff Size: Large adult   Pulse: 76   Weight: 110 kg (243 lb 3.2 oz)   Height: 1.676 m (5' 6\")      Problem List Items Addressed This Visit       Diabetes mellitus (Multi)    Hyperlipidemia    Hypertension    Need for influenza vaccination    Relevant Orders    Flu " "vaccine, trivalent, preservative free, HIGH-DOSE, age 65y+ (Fluzone)    Dysuria - Primary    Constipation    Relevant Medications    docusate sodium (Colace) 100 mg capsule    Hx of prostatectomy      Orders Placed This Encounter   Procedures    Flu vaccine, trivalent, preservative free, HIGH-DOSE, age 65y+ (Fluzone)        HPI  This is a 74-year-old gentleman who has undergone prostatectomy  He had some concern of urine infection and therefore contacted urologist who has already sent urine for culture and UA has been done which is positive  He has been reaching out to urologist for follow-up in next day or 2 that he has been advised will be advised about possible treatment  He had no fever  He also has constipation and wanted something for it    ROS  Otherwise negative  Past medical history reviewed  Social and family history reviewed  Allergies and medications reviewed  Recent labs reviewed  Vital signs reviewed    PHYSICAL EXAM  Cardiovascular chest abdominal neurological examination normal      No results found for: \"PR1\", \"BMPR1A\", \"CMPLAS\", \"AF0ZYAEP\", \"KPSAT\"   Lab Results   Component Value Date    CHOL 132 07/09/2024    LDLCALC 56 07/09/2024    CHHDL 2.8 07/09/2024                " 31-Jan-2020

## 2024-10-14 NOTE — PROGRESS NOTES
Pt walked into office requesting to speak with doctor regarding urinary symptoms. Reports he is urinating easily but is noticing darker urine, occasional blood, and foul odor. Denies fever, chills, pain. States he avoids caffeine but drinks a lot of tea. Order placed for UA with reflex culture and pt directed to lab. Advised pt final culture results take 2-3 days, to avoid tea, and to drink at least 64 ounces of water daily. Instructed on when to call or go to the ED for persistent or worsening symptoms of infection or retention. Voiced understanding.

## 2024-10-15 ENCOUNTER — APPOINTMENT (OUTPATIENT)
Dept: UROLOGY | Facility: CLINIC | Age: 74
End: 2024-10-15
Payer: COMMERCIAL

## 2024-10-16 DIAGNOSIS — N13.8 BENIGN PROSTATIC HYPERPLASIA WITH URINARY OBSTRUCTION: Primary | ICD-10-CM

## 2024-10-16 DIAGNOSIS — N40.1 BENIGN PROSTATIC HYPERPLASIA WITH URINARY OBSTRUCTION: Primary | ICD-10-CM

## 2024-10-16 LAB — BACTERIA UR CULT: ABNORMAL

## 2024-10-16 RX ORDER — CIPROFLOXACIN 500 MG/1
500 TABLET ORAL 2 TIMES DAILY
Qty: 14 TABLET | Refills: 0 | Status: SHIPPED | OUTPATIENT
Start: 2024-10-16 | End: 2024-10-23

## 2024-10-17 ENCOUNTER — APPOINTMENT (OUTPATIENT)
Dept: UROLOGY | Facility: HOSPITAL | Age: 74
End: 2024-10-17
Payer: COMMERCIAL

## 2024-10-18 ENCOUNTER — OFFICE VISIT (OUTPATIENT)
Dept: UROLOGY | Facility: CLINIC | Age: 74
End: 2024-10-18
Payer: COMMERCIAL

## 2024-10-18 ENCOUNTER — APPOINTMENT (OUTPATIENT)
Dept: UROLOGY | Facility: HOSPITAL | Age: 74
End: 2024-10-18
Payer: COMMERCIAL

## 2024-10-18 VITALS
TEMPERATURE: 98.2 F | BODY MASS INDEX: 39.19 KG/M2 | SYSTOLIC BLOOD PRESSURE: 153 MMHG | HEART RATE: 87 BPM | DIASTOLIC BLOOD PRESSURE: 78 MMHG | WEIGHT: 242.8 LBS

## 2024-10-18 DIAGNOSIS — N40.1 BENIGN PROSTATIC HYPERPLASIA WITH URINARY RETENTION: ICD-10-CM

## 2024-10-18 DIAGNOSIS — R33.8 BENIGN PROSTATIC HYPERPLASIA WITH URINARY RETENTION: ICD-10-CM

## 2024-10-18 PROCEDURE — 1123F ACP DISCUSS/DSCN MKR DOCD: CPT | Performed by: UROLOGY

## 2024-10-18 PROCEDURE — 1160F RVW MEDS BY RX/DR IN RCRD: CPT | Performed by: UROLOGY

## 2024-10-18 PROCEDURE — 3078F DIAST BP <80 MM HG: CPT | Performed by: UROLOGY

## 2024-10-18 PROCEDURE — 1036F TOBACCO NON-USER: CPT | Performed by: UROLOGY

## 2024-10-18 PROCEDURE — 3077F SYST BP >= 140 MM HG: CPT | Performed by: UROLOGY

## 2024-10-18 PROCEDURE — 3051F HG A1C>EQUAL 7.0%<8.0%: CPT | Performed by: UROLOGY

## 2024-10-18 PROCEDURE — 1159F MED LIST DOCD IN RCRD: CPT | Performed by: UROLOGY

## 2024-10-18 PROCEDURE — 3048F LDL-C <100 MG/DL: CPT | Performed by: UROLOGY

## 2024-10-18 RX ORDER — TROSPIUM CHLORIDE 20 MG/1
20 TABLET, FILM COATED ORAL 2 TIMES DAILY
Qty: 120 TABLET | Refills: 3 | Status: SHIPPED | OUTPATIENT
Start: 2024-10-18 | End: 2025-10-18

## 2024-10-18 NOTE — PROGRESS NOTES
PRIOR NOTES  74-year-old male with massive BPH and urinary retention who was referred to me by my colleague for a 350 g prostate for robotic simple prostatectomy.    OR 9/18/2024-very challenging, but technically successful robotic simple prostatectomy.  Very small bladder capacity, roughly 50 mL.    9/27/2024, catheter removed.  Patient was able to void without difficulty    Ucx 10/14/24 - Ecoli    UPDATED SUBJECTIVE HISTORY  10/18/24 - Pt reports he is voiding. Holds for 2-3 hours. Still some urgency. Voiding every hour or so at night.     Past Medical History  He has a past medical history of Benign prostatic hyperplasia with lower urinary tract symptoms, GERD (gastroesophageal reflux disease), Overweight, Personal history of other diseases of urinary system, Personal history of other endocrine, nutritional and metabolic disease, Personal history of other specified conditions, Presence of other specified devices (02/07/2022), Tinea unguium, and Type 2 diabetes mellitus.    Surgical History  He has a past surgical history that includes Other surgical history (02/06/2022); Other surgical history (01/25/2022); and Prostate surgery.     Social History  He reports that he has never smoked. He has never used smokeless tobacco. He reports that he does not currently use alcohol after a past usage of about 2.0 - 3.0 standard drinks of alcohol per week. He reports that he does not use drugs.    Family History  Family History   Problem Relation Name Age of Onset    Diabetes Mother      No Known Problems Father          Allergies  Patient has no known allergies.    ROS: 12 system review was completed and is negative with the exception of those signs and symptoms noted in the history of present illness: A 12 system review was completed and is negative with the exception of those signs and symptoms noted in the history of present illness.     Exam:  General: in NAD, appears stated age  Head: normocephalic,  atraumatic  Respiratory: normal effort, no use of accessory muscles  Cardiovascular: no edema noted  Skin: normal turgor, no rashes  Neurologic: grossly intact, oriented to person/place/time  Psychiatric: mode and affect appropriate  Incisions clean/dry/intact, no herniation, well-healed     Last Recorded Vitals  Blood pressure 153/78, pulse 87, temperature 36.8 °C (98.2 °F), temperature source Temporal, weight 110 kg (242 lb 12.8 oz).    Lab Results   Component Value Date    CREATININE 1.71 (H) 09/19/2024    HGB 9.5 (L) 09/19/2024         ASSESSMENT/PLAN:  # Urinary retention with a 400 g prostate s/p robotic simple prostatectomy  -Stop tamsulosin  -Continue ciprofloxacin  -Start trospium 20 mg twice daily    Wilfredo Monet MD

## 2024-11-06 ENCOUNTER — PATIENT OUTREACH (OUTPATIENT)
Dept: PRIMARY CARE | Facility: CLINIC | Age: 74
End: 2024-11-06
Payer: COMMERCIAL

## 2024-12-10 DIAGNOSIS — E11.9 TYPE 2 DIABETES MELLITUS WITHOUT COMPLICATION, WITHOUT LONG-TERM CURRENT USE OF INSULIN (MULTI): ICD-10-CM

## 2024-12-10 DIAGNOSIS — E78.5 HYPERLIPIDEMIA, UNSPECIFIED HYPERLIPIDEMIA TYPE: ICD-10-CM

## 2024-12-10 NOTE — TELEPHONE ENCOUNTER
[phone]  [facility] is unable to reach the patient. He has not had his medication refilled.    They are asking if our staff can reach out and verify the patient is still taking the atorvastatin and send a refill if needed.

## 2024-12-11 RX ORDER — ATORVASTATIN CALCIUM 10 MG/1
10 TABLET, FILM COATED ORAL DAILY
Qty: 90 TABLET | Refills: 0 | Status: SHIPPED | OUTPATIENT
Start: 2024-12-11

## 2024-12-11 NOTE — TELEPHONE ENCOUNTER
Spoke with pt as requested and confirmed pt is still taking Atorvastatin 10mg, please send to Renee Iniguez 10/14/2024  Pov - none pending

## 2025-02-13 ENCOUNTER — PATIENT MESSAGE (OUTPATIENT)
Dept: PRIMARY CARE | Facility: CLINIC | Age: 75
End: 2025-02-13
Payer: COMMERCIAL

## 2025-02-24 ENCOUNTER — APPOINTMENT (OUTPATIENT)
Dept: PRIMARY CARE | Facility: CLINIC | Age: 75
End: 2025-02-24
Payer: MEDICARE

## 2025-02-24 VITALS
WEIGHT: 254 LBS | SYSTOLIC BLOOD PRESSURE: 147 MMHG | HEART RATE: 75 BPM | OXYGEN SATURATION: 97 % | DIASTOLIC BLOOD PRESSURE: 78 MMHG | TEMPERATURE: 97.4 F | BODY MASS INDEX: 41 KG/M2

## 2025-02-24 DIAGNOSIS — E78.5 HYPERLIPIDEMIA, UNSPECIFIED HYPERLIPIDEMIA TYPE: ICD-10-CM

## 2025-02-24 DIAGNOSIS — Z23 NEED FOR PNEUMOCOCCAL 20-VALENT CONJUGATE VACCINATION: ICD-10-CM

## 2025-02-24 DIAGNOSIS — Z78.9 NEVER SMOKED CIGARETTES: ICD-10-CM

## 2025-02-24 DIAGNOSIS — I10 PRIMARY HYPERTENSION: ICD-10-CM

## 2025-02-24 DIAGNOSIS — Z12.11 ENCOUNTER FOR SCREENING FOR MALIGNANT NEOPLASM OF COLON: ICD-10-CM

## 2025-02-24 DIAGNOSIS — E11.9 TYPE 2 DIABETES MELLITUS WITHOUT COMPLICATION, WITHOUT LONG-TERM CURRENT USE OF INSULIN (MULTI): ICD-10-CM

## 2025-02-24 DIAGNOSIS — E66.01 MORBID OBESITY WITH BMI OF 40.0-44.9, ADULT (MULTI): ICD-10-CM

## 2025-02-24 PROCEDURE — 99214 OFFICE O/P EST MOD 30 MIN: CPT | Performed by: INTERNAL MEDICINE

## 2025-02-24 PROCEDURE — 3077F SYST BP >= 140 MM HG: CPT | Performed by: INTERNAL MEDICINE

## 2025-02-24 PROCEDURE — G0009 ADMIN PNEUMOCOCCAL VACCINE: HCPCS | Performed by: INTERNAL MEDICINE

## 2025-02-24 PROCEDURE — 90677 PCV20 VACCINE IM: CPT | Performed by: INTERNAL MEDICINE

## 2025-02-24 PROCEDURE — 1159F MED LIST DOCD IN RCRD: CPT | Performed by: INTERNAL MEDICINE

## 2025-02-24 PROCEDURE — 3078F DIAST BP <80 MM HG: CPT | Performed by: INTERNAL MEDICINE

## 2025-02-24 PROCEDURE — 1036F TOBACCO NON-USER: CPT | Performed by: INTERNAL MEDICINE

## 2025-02-24 PROCEDURE — 1160F RVW MEDS BY RX/DR IN RCRD: CPT | Performed by: INTERNAL MEDICINE

## 2025-02-24 PROCEDURE — 1123F ACP DISCUSS/DSCN MKR DOCD: CPT | Performed by: INTERNAL MEDICINE

## 2025-02-24 PROCEDURE — 4010F ACE/ARB THERAPY RXD/TAKEN: CPT | Performed by: INTERNAL MEDICINE

## 2025-02-24 RX ORDER — LISINOPRIL 5 MG/1
5 TABLET ORAL DAILY
Qty: 90 TABLET | Refills: 3 | Status: SHIPPED | OUTPATIENT
Start: 2025-02-24

## 2025-02-24 RX ORDER — ATORVASTATIN CALCIUM 10 MG/1
10 TABLET, FILM COATED ORAL DAILY
Qty: 90 TABLET | Refills: 3 | Status: SHIPPED | OUTPATIENT
Start: 2025-02-24

## 2025-02-24 RX ORDER — METFORMIN HYDROCHLORIDE 500 MG/1
1000 TABLET ORAL 2 TIMES DAILY
Qty: 360 TABLET | Refills: 3 | Status: SHIPPED | OUTPATIENT
Start: 2025-02-24

## 2025-02-24 RX ORDER — GLIMEPIRIDE 2 MG/1
2 TABLET ORAL DAILY
Qty: 90 TABLET | Refills: 3 | Status: SHIPPED | OUTPATIENT
Start: 2025-02-24

## 2025-02-24 NOTE — PROGRESS NOTES
Assessment/Plan   Problem List Items Addressed This Visit       Diabetes mellitus (Multi)    Relevant Medications    lisinopril 5 mg tablet    glimepiride (Amaryl) 2 mg tablet    metFORMIN (Glucophage) 500 mg tablet    atorvastatin (Lipitor) 10 mg tablet    Other Relevant Orders    Albumin-Creatinine Ratio, Urine Random    CBC    Comprehensive metabolic panel    Lipid panel    Hemoglobin A1c    Hyperlipidemia    Relevant Medications    lisinopril 5 mg tablet    atorvastatin (Lipitor) 10 mg tablet    Other Relevant Orders    CBC    Comprehensive metabolic panel    Lipid panel    Hemoglobin A1c    Hypertension    Relevant Medications    lisinopril 5 mg tablet    Other Relevant Orders    CBC    Comprehensive metabolic panel    Lipid panel    Hemoglobin A1c    Never smoked cigarettes     Other Visit Diagnoses       Morbid obesity with BMI of 40.0-44.9, adult (Multi)        Need for pneumococcal 20-valent conjugate vaccination        Relevant Orders    Pneumococcal conjugate vaccine, 20-valent (PREVNAR 20)    Encounter for screening for malignant neoplasm of colon        Relevant Orders    Cologuard® colon cancer screening        Hypertension uncontrolled  Add lisinopril  Diabetes labs as ordered  Hyperlipidemia labs to be done  Preventive care discussed immunization as ordered  Colorectal screening is ordered  Obesity diet and exercise  Further follow-up in 2 months time    Subjective   Patient ID: Kameron Candelaria is a 74 y.o. male who presents for Follow-up.    Past Surgical History:   Procedure Laterality Date    OTHER SURGICAL HISTORY  02/06/2022    Colonoscopy    OTHER SURGICAL HISTORY  01/25/2022    Hand surgery    PROSTATE SURGERY        Family History   Problem Relation Name Age of Onset    Diabetes Mother      No Known Problems Father        Social History     Socioeconomic History    Marital status:      Spouse name: Not on file    Number of children: Not on file    Years of education: Not on file     Highest education level: Not on file   Occupational History    Not on file   Tobacco Use    Smoking status: Never    Smokeless tobacco: Never   Vaping Use    Vaping status: Never Used   Substance and Sexual Activity    Alcohol use: Not Currently     Alcohol/week: 2.0 - 3.0 standard drinks of alcohol     Types: 2 - 3 Cans of beer per week    Drug use: Never    Sexual activity: Not Currently   Other Topics Concern    Not on file   Social History Narrative    Not on file     Social Drivers of Health     Financial Resource Strain: Low Risk  (9/18/2024)    Overall Financial Resource Strain (CARDIA)     Difficulty of Paying Living Expenses: Not hard at all   Food Insecurity: Not on file   Transportation Needs: No Transportation Needs (9/18/2024)    PRAPARE - Transportation     Lack of Transportation (Medical): No     Lack of Transportation (Non-Medical): No   Physical Activity: Not on file   Stress: Not on file   Social Connections: Not on file   Intimate Partner Violence: Not on file   Housing Stability: Low Risk  (9/18/2024)    Housing Stability Vital Sign     Unable to Pay for Housing in the Last Year: No     Number of Times Moved in the Last Year: 1     Homeless in the Last Year: No      Patient has no known allergies.   Current Outpatient Medications   Medication Sig Dispense Refill    acetaminophen (TylenoL) 325 mg tablet Take 2 tablets (650 mg) by mouth every 8 hours if needed for moderate pain (4 - 6).      lancets 33 gauge misc 1 Lancet once daily. May provide what insurance will cover. 100 each 1    OneTouch Ultra Test strip 1 strip by Does not apply route once daily. 100 each 1    trospium (Sanctura) 20 mg tablet Take 1 tablet (20 mg) by mouth 2 times a day. 120 tablet 3    atorvastatin (Lipitor) 10 mg tablet Take 1 tablet (10 mg) by mouth once daily. 90 tablet 3    glimepiride (Amaryl) 2 mg tablet Take 1 tablet (2 mg) by mouth once daily. 90 tablet 3    lisinopril 5 mg tablet Take 1 tablet (5 mg) by mouth once  daily. 90 tablet 3    metFORMIN (Glucophage) 500 mg tablet Take 2 tablets (1,000 mg) by mouth 2 times a day. 360 tablet 3     No current facility-administered medications for this visit.      Vitals:    02/24/25 1126   BP: 147/78   BP Location: Left arm   Patient Position: Sitting   Pulse: 75   Temp: 36.3 °C (97.4 °F)   TempSrc: Skin   SpO2: 97%   Weight: 115 kg (254 lb)      Problem List Items Addressed This Visit       Diabetes mellitus (Multi)    Relevant Medications    lisinopril 5 mg tablet    glimepiride (Amaryl) 2 mg tablet    metFORMIN (Glucophage) 500 mg tablet    atorvastatin (Lipitor) 10 mg tablet    Other Relevant Orders    Albumin-Creatinine Ratio, Urine Random    CBC    Comprehensive metabolic panel    Lipid panel    Hemoglobin A1c    Hyperlipidemia    Relevant Medications    lisinopril 5 mg tablet    atorvastatin (Lipitor) 10 mg tablet    Other Relevant Orders    CBC    Comprehensive metabolic panel    Lipid panel    Hemoglobin A1c    Hypertension    Relevant Medications    lisinopril 5 mg tablet    Other Relevant Orders    CBC    Comprehensive metabolic panel    Lipid panel    Hemoglobin A1c    Never smoked cigarettes     Other Visit Diagnoses       Morbid obesity with BMI of 40.0-44.9, adult (Multi)        Need for pneumococcal 20-valent conjugate vaccination        Relevant Orders    Pneumococcal conjugate vaccine, 20-valent (PREVNAR 20)    Encounter for screening for malignant neoplasm of colon        Relevant Orders    Cologuard® colon cancer screening           Orders Placed This Encounter   Procedures    Pneumococcal conjugate vaccine, 20-valent (PREVNAR 20)    Albumin-Creatinine Ratio, Urine Random     Standing Status:   Future     Number of Occurrences:   1     Standing Expiration Date:   2/24/2026     Order Specific Question:   Release result to [employer]     Answer:   Immediate [1]    CBC     Standing Status:   Future     Number of Occurrences:   1     Standing Expiration Date:   2/24/2026     " Order Specific Question:   Release result to [employer]     Answer:   Immediate [1]    Comprehensive metabolic panel     Standing Status:   Future     Number of Occurrences:   1     Standing Expiration Date:   2/24/2026     Order Specific Question:   Release result to [employer]     Answer:   Immediate [1]    Lipid panel     Standing Status:   Future     Number of Occurrences:   1     Standing Expiration Date:   2/24/2026     Order Specific Question:   Release result to [employer]     Answer:   Immediate [1]    Hemoglobin A1c     Standing Status:   Future     Number of Occurrences:   1     Standing Expiration Date:   2/24/2026     Order Specific Question:   Release result to [employer]     Answer:   Immediate [1]    Cologuard® colon cancer screening     Standing Status:   Future     Number of Occurrences:   1     Standing Expiration Date:   2/24/2026     Order Specific Question:   Release result to [employer]     Answer:   Immediate [1]        HPI  In for review and follow-up  He has no complaint but has gained weight  His blood pressure has been elevated at home as well    ROS otherwise negative  Discussed immunization as well as colorectal cancer screening as well as diabetes care and weight reduction  Past medical history reviewed  Social and family history reviewed  Allergies and medications reviewed  Recent labs reviewed  Vital signs reviewed    PHYSICAL EXAM  Cardiovascular chest abdominal neurological examination normal      No results found for: \"PR1\", \"BMPR1A\", \"CMPLAS\", \"[id number]\", \"KPSAT\"   Lab Results   Component Value Date    CHOL 132 07/09/2024    LDLCALC 56 07/09/2024    CHHDL 2.8 07/09/2024                "

## 2025-02-26 LAB
ALBUMIN SERPL-MCNC: 4.2 G/DL (ref 3.6–5.1)
ALBUMIN/CREAT UR: 4 MG/G CREAT
ALP SERPL-CCNC: 77 U/L (ref 35–144)
ALT SERPL-CCNC: 14 U/L (ref 9–46)
ANION GAP SERPL CALCULATED.4IONS-SCNC: 8 MMOL/L (CALC) (ref 7–17)
AST SERPL-CCNC: 13 U/L (ref 10–35)
BILIRUB SERPL-MCNC: 0.6 MG/DL (ref 0.2–1.2)
BUN SERPL-MCNC: 21 MG/DL (ref 7–25)
CALCIUM SERPL-MCNC: 9.2 MG/DL (ref 8.6–10.3)
CHLORIDE SERPL-SCNC: 104 MMOL/L (ref 98–110)
CHOLEST SERPL-MCNC: 138 MG/DL
CHOLEST/HDLC SERPL: 2.6 (CALC)
CO2 SERPL-SCNC: 27 MMOL/L (ref 20–32)
CREAT SERPL-MCNC: 1.03 MG/DL (ref 0.7–1.28)
CREAT UR-MCNC: 107 MG/DL (ref 20–320)
EGFRCR SERPLBLD CKD-EPI 2021: 76 ML/MIN/1.73M2
ERYTHROCYTE [DISTWIDTH] IN BLOOD BY AUTOMATED COUNT: 18.4 % (ref 11–15)
EST. AVERAGE GLUCOSE BLD GHB EST-MCNC: 166 MG/DL
EST. AVERAGE GLUCOSE BLD GHB EST-SCNC: 9.2 MMOL/L
GLUCOSE SERPL-MCNC: 141 MG/DL (ref 65–99)
HBA1C MFR BLD: 7.4 % OF TOTAL HGB
HCT VFR BLD AUTO: 40.3 % (ref 38.5–50)
HDLC SERPL-MCNC: 54 MG/DL
HGB BLD-MCNC: 11.9 G/DL (ref 13.2–17.1)
LDLC SERPL CALC-MCNC: 64 MG/DL (CALC)
MCH RBC QN AUTO: 22.1 PG (ref 27–33)
MCHC RBC AUTO-ENTMCNC: 29.5 G/DL (ref 32–36)
MCV RBC AUTO: 74.9 FL (ref 80–100)
MICROALBUMIN UR-MCNC: 0.4 MG/DL
NONHDLC SERPL-MCNC: 84 MG/DL (CALC)
PLATELET # BLD AUTO: 257 THOUSAND/UL (ref 140–400)
PMV BLD REES-ECKER: 10.3 FL (ref 7.5–12.5)
POTASSIUM SERPL-SCNC: 5 MMOL/L (ref 3.5–5.3)
PROT SERPL-MCNC: 7.2 G/DL (ref 6.1–8.1)
RBC # BLD AUTO: 5.38 MILLION/UL (ref 4.2–5.8)
SODIUM SERPL-SCNC: 139 MMOL/L (ref 135–146)
TRIGL SERPL-MCNC: 114 MG/DL
WBC # BLD AUTO: 8.9 THOUSAND/UL (ref 3.8–10.8)

## 2025-03-05 ENCOUNTER — TELEPHONE (OUTPATIENT)
Dept: CARDIOLOGY | Facility: CLINIC | Age: 75
End: 2025-03-05
Payer: MEDICARE

## 2025-03-05 NOTE — TELEPHONE ENCOUNTER
----- Message from Sebastian Connelly sent at 3/3/2025  1:32 PM EST -----  Result is self-explanatory anemia was noted but better than before  We will review and seen in the office and discussed the results then

## 2025-03-07 LAB — NONINV COLON CA DNA+OCC BLD SCRN STL QL: NEGATIVE

## 2025-04-18 ENCOUNTER — APPOINTMENT (OUTPATIENT)
Dept: UROLOGY | Facility: CLINIC | Age: 75
End: 2025-04-18
Payer: COMMERCIAL

## 2025-04-18 VITALS — SYSTOLIC BLOOD PRESSURE: 146 MMHG | DIASTOLIC BLOOD PRESSURE: 79 MMHG | HEART RATE: 80 BPM

## 2025-04-18 DIAGNOSIS — R33.8 BENIGN PROSTATIC HYPERPLASIA WITH URINARY RETENTION: Primary | ICD-10-CM

## 2025-04-18 DIAGNOSIS — N40.1 BENIGN PROSTATIC HYPERPLASIA WITH URINARY RETENTION: Primary | ICD-10-CM

## 2025-04-18 PROCEDURE — 3077F SYST BP >= 140 MM HG: CPT | Performed by: UROLOGY

## 2025-04-18 PROCEDURE — 1159F MED LIST DOCD IN RCRD: CPT | Performed by: UROLOGY

## 2025-04-18 PROCEDURE — 99213 OFFICE O/P EST LOW 20 MIN: CPT | Performed by: UROLOGY

## 2025-04-18 PROCEDURE — 4010F ACE/ARB THERAPY RXD/TAKEN: CPT | Performed by: UROLOGY

## 2025-04-18 PROCEDURE — 1123F ACP DISCUSS/DSCN MKR DOCD: CPT | Performed by: UROLOGY

## 2025-04-18 PROCEDURE — 3078F DIAST BP <80 MM HG: CPT | Performed by: UROLOGY

## 2025-04-18 PROCEDURE — 1160F RVW MEDS BY RX/DR IN RCRD: CPT | Performed by: UROLOGY

## 2025-04-18 PROCEDURE — 1036F TOBACCO NON-USER: CPT | Performed by: UROLOGY

## 2025-04-18 NOTE — PROGRESS NOTES
PAST UROLOGICAL HISTORY:  74-year-old man with massive BPH and urinary retention who was referred by a colleague for a 350 g prostate for robotic simple prostatectomy. On 09/18/2024, he underwent a very challenging but technically successful robotic simple prostatectomy. The patient had a very small bladder capacity of approximately 50 mL. On 09/27/2024, his catheter was removed, and he was able to void without difficulty. Urine culture on 10/14/2024 showed E. coli.    I have independently reviewed the patient's prior surgical records and laboratory results as noted herein.    HPI TODAY 04/18/2025:    Benign Prostatic Hyperplasia (BPH) s/p Robotic Simple Prostatectomy:  - Mr. Candelaria is doing well overall following his robotic simple prostatectomy performed approximately 7 months ago.  - He reports adequate daytime voiding with good stream strength.  - He experiences nocturia 3-4 times per night, which varies in frequency.  - He denies any urinary leakage and is not using pads.  - He denies any problems with his surgical incisions or bulges.    PAST MEDICAL HISTORY:  - History of being treated by Dr. Arango in the past    SOCIAL:  - Has a daughter with children  - Has a 6-year-old grandchild who recently had a birthday  - Reports needing to lose weight    REVIEW OF SYSTEMS: A tailored review of systems was performed and all pertinent positives and negatives are listed in the HPI.    PHYSICAL EXAMINATION:  Gen: NAD  Pulm: No increased WOB on RA  Cards: WWP    ASSESSMENT/PLAN:    Benign Prostatic Hyperplasia s/p Robotic Simple Prostatectomy (N40.1)  - Assessment: Patient is doing well overall following robotic simple prostatectomy performed on 09/18/2024. He has good daytime voiding with adequate stream but continues to experience nocturia 3-4 times per night.  - Plan:    - Continue to monitor voiding patterns    - Advised to avoid fluid intake for 2 hours before bedtime    - Recommended voiding twice before sleep    -  Weight loss encouraged to help improve urinary symptoms    - Follow up as needed  - Counseling: Discussed that nocturia may be multifactorial and not always related to bladder issues. Reviewed possibility of sleep apnea contributing to increased nighttime urine production, though patient denies snoring.

## 2025-05-27 ENCOUNTER — TELEPHONE (OUTPATIENT)
Dept: PRIMARY CARE | Facility: CLINIC | Age: 75
End: 2025-05-27
Payer: MEDICARE

## 2025-05-27 DIAGNOSIS — E11.9 TYPE 2 DIABETES MELLITUS WITHOUT COMPLICATION, WITHOUT LONG-TERM CURRENT USE OF INSULIN: ICD-10-CM

## 2025-05-27 DIAGNOSIS — I10 PRIMARY HYPERTENSION: ICD-10-CM

## 2025-05-27 DIAGNOSIS — E78.5 HYPERLIPIDEMIA, UNSPECIFIED HYPERLIPIDEMIA TYPE: ICD-10-CM

## 2025-05-27 NOTE — TELEPHONE ENCOUNTER
Rx Refill Request Telephone Encounter    Name:  Kameron Candelaria  :  [id number]  Medication Name:  4 medications  Lisinopril  Glimepiride  Metformin  atorvastatin  Specific Pharmacy location:  [facility]  Date of last appointment:  25  Date of next appointment: 25  Best number to reach patient:

## 2025-05-28 DIAGNOSIS — R33.8 BENIGN PROSTATIC HYPERPLASIA WITH URINARY RETENTION: ICD-10-CM

## 2025-05-28 DIAGNOSIS — N40.1 BENIGN PROSTATIC HYPERPLASIA WITH URINARY RETENTION: ICD-10-CM

## 2025-05-29 RX ORDER — TROSPIUM CHLORIDE 20 MG/1
20 TABLET, FILM COATED ORAL 2 TIMES DAILY
Qty: 120 TABLET | Refills: 3 | Status: SHIPPED | OUTPATIENT
Start: 2025-05-29 | End: 2026-05-29

## 2025-05-30 RX ORDER — GLIMEPIRIDE 2 MG/1
2 TABLET ORAL DAILY
Qty: 90 TABLET | Refills: 1 | Status: SHIPPED | OUTPATIENT
Start: 2025-05-30

## 2025-05-30 RX ORDER — ATORVASTATIN CALCIUM 10 MG/1
10 TABLET, FILM COATED ORAL DAILY
Qty: 90 TABLET | Refills: 1 | Status: SHIPPED | OUTPATIENT
Start: 2025-05-30

## 2025-05-30 RX ORDER — METFORMIN HYDROCHLORIDE 500 MG/1
1000 TABLET ORAL 2 TIMES DAILY
Qty: 360 TABLET | Refills: 1 | Status: SHIPPED | OUTPATIENT
Start: 2025-05-30

## 2025-05-30 RX ORDER — LISINOPRIL 5 MG/1
5 TABLET ORAL DAILY
Qty: 90 TABLET | Refills: 1 | Status: SHIPPED | OUTPATIENT
Start: 2025-05-30

## 2025-06-16 ENCOUNTER — APPOINTMENT (OUTPATIENT)
Dept: PRIMARY CARE | Facility: CLINIC | Age: 75
End: 2025-06-16
Payer: MEDICARE

## 2025-06-16 VITALS
HEIGHT: 66 IN | BODY MASS INDEX: 40.37 KG/M2 | HEART RATE: 84 BPM | TEMPERATURE: 97.5 F | SYSTOLIC BLOOD PRESSURE: 131 MMHG | DIASTOLIC BLOOD PRESSURE: 74 MMHG | WEIGHT: 251.2 LBS

## 2025-06-16 DIAGNOSIS — Z00.00 ROUTINE GENERAL MEDICAL EXAMINATION AT HEALTH CARE FACILITY: Primary | ICD-10-CM

## 2025-06-16 DIAGNOSIS — E78.5 HYPERLIPIDEMIA, UNSPECIFIED HYPERLIPIDEMIA TYPE: ICD-10-CM

## 2025-06-16 DIAGNOSIS — E66.01 OBESITY, MORBID (MULTI): ICD-10-CM

## 2025-06-16 DIAGNOSIS — Z00.00 WELL ADULT HEALTH CHECK: ICD-10-CM

## 2025-06-16 DIAGNOSIS — E11.42 TYPE 2 DIABETES MELLITUS WITH DIABETIC POLYNEUROPATHY, WITHOUT LONG-TERM CURRENT USE OF INSULIN: ICD-10-CM

## 2025-06-16 DIAGNOSIS — D64.9 ANEMIA, UNSPECIFIED TYPE: ICD-10-CM

## 2025-06-16 PROCEDURE — 1158F ADVNC CARE PLAN TLK DOCD: CPT | Performed by: INTERNAL MEDICINE

## 2025-06-16 PROCEDURE — G0439 PPPS, SUBSEQ VISIT: HCPCS | Performed by: INTERNAL MEDICINE

## 2025-06-16 PROCEDURE — 3075F SYST BP GE 130 - 139MM HG: CPT | Performed by: INTERNAL MEDICINE

## 2025-06-16 PROCEDURE — 1159F MED LIST DOCD IN RCRD: CPT | Performed by: INTERNAL MEDICINE

## 2025-06-16 PROCEDURE — 1170F FXNL STATUS ASSESSED: CPT | Performed by: INTERNAL MEDICINE

## 2025-06-16 PROCEDURE — 4010F ACE/ARB THERAPY RXD/TAKEN: CPT | Performed by: INTERNAL MEDICINE

## 2025-06-16 PROCEDURE — 1036F TOBACCO NON-USER: CPT | Performed by: INTERNAL MEDICINE

## 2025-06-16 PROCEDURE — 3078F DIAST BP <80 MM HG: CPT | Performed by: INTERNAL MEDICINE

## 2025-06-16 PROCEDURE — 1160F RVW MEDS BY RX/DR IN RCRD: CPT | Performed by: INTERNAL MEDICINE

## 2025-06-16 ASSESSMENT — ACTIVITIES OF DAILY LIVING (ADL)
TAKING_MEDICATION: INDEPENDENT
DRESSING: INDEPENDENT
BATHING: INDEPENDENT
DOING_HOUSEWORK: INDEPENDENT
MANAGING_FINANCES: INDEPENDENT
GROCERY_SHOPPING: INDEPENDENT

## 2025-06-16 ASSESSMENT — PATIENT HEALTH QUESTIONNAIRE - PHQ9
2. FEELING DOWN, DEPRESSED OR HOPELESS: NOT AT ALL
SUM OF ALL RESPONSES TO PHQ9 QUESTIONS 1 AND 2: 0
1. LITTLE INTEREST OR PLEASURE IN DOING THINGS: NOT AT ALL

## 2025-06-16 NOTE — PROGRESS NOTES
"Subjective   Reason for Visit: Kameron Candelaria is an 75 y.o. male here for a Medicare Wellness visit.     Past Medical, Surgical, and Family History reviewed and updated in chart.    Reviewed all medications by prescribing practitioner or clinical pharmacist (such as prescriptions, OTCs, herbal therapies and supplements) and documented in the medical record.    HPI  Came in for wellness visit  Immunization up to the wilbert    Cologuard negative  Has living will  Very happy with the outcome of prostate surgery  No complaint    Patient Care Team:  Sebastian Connelly MD as PCP - General  Sebastian Connelly MD as PCP - [employer] PCP  Wilfredo Reyes MD as Surgeon (Urology)  Thomas Villar MD as Surgeon (Urology)     Review of Systems negative  Past medical history reviewed  Social and family history reviewed  Allergies and medications reviewed  Recent labs reviewed  Vital signs reviewed    Objective   Vitals:  /74 (BP Location: Left arm, Patient Position: Sitting)   Pulse 84   Temp 36.4 °C (97.5 °F)   Ht 1.676 m (5' 6\")   Wt 114 kg (251 lb 3.2 oz)   BMI 40.54 kg/m²       Physical Exam  Exam normal    Assessment & Plan  Routine general medical examination at health care facility    Orders:    1 Year Follow Up In Primary Care - Wellness Exam; Future    Type 2 diabetes mellitus with diabetic polyneuropathy, without long-term current use of insulin    Orders:    Hemoglobin A1C; Future    Well adult health check    Orders:    Comprehensive Metabolic Panel; Future    Hyperlipidemia, unspecified hyperlipidemia type    Orders:    Lipid Panel; Future    Obesity, morbid (Multi)         Anemia, unspecified type    Orders:    CBC; Future              "

## 2025-08-25 ENCOUNTER — TELEPHONE (OUTPATIENT)
Dept: PRIMARY CARE | Facility: CLINIC | Age: 75
End: 2025-08-25
Payer: MEDICARE

## 2025-08-25 DIAGNOSIS — E11.9 TYPE 2 DIABETES MELLITUS WITHOUT COMPLICATION, WITHOUT LONG-TERM CURRENT USE OF INSULIN: ICD-10-CM

## 2025-08-25 DIAGNOSIS — I10 PRIMARY HYPERTENSION: ICD-10-CM

## 2025-08-25 DIAGNOSIS — E78.5 HYPERLIPIDEMIA, UNSPECIFIED HYPERLIPIDEMIA TYPE: ICD-10-CM

## 2025-08-25 RX ORDER — METFORMIN HYDROCHLORIDE 500 MG/1
1000 TABLET ORAL 2 TIMES DAILY
Qty: 360 TABLET | Refills: 1 | Status: SHIPPED | OUTPATIENT
Start: 2025-08-25

## 2025-08-25 RX ORDER — LISINOPRIL 5 MG/1
5 TABLET ORAL DAILY
Qty: 90 TABLET | Refills: 1 | Status: SHIPPED | OUTPATIENT
Start: 2025-08-25

## 2025-08-25 RX ORDER — ATORVASTATIN CALCIUM 10 MG/1
10 TABLET, FILM COATED ORAL DAILY
Qty: 90 TABLET | Refills: 1 | Status: SHIPPED | OUTPATIENT
Start: 2025-08-25

## 2025-08-25 RX ORDER — GLIMEPIRIDE 2 MG/1
2 TABLET ORAL DAILY
Qty: 90 TABLET | Refills: 1 | Status: SHIPPED | OUTPATIENT
Start: 2025-08-25

## 2025-09-22 ENCOUNTER — APPOINTMENT (OUTPATIENT)
Dept: PRIMARY CARE | Facility: CLINIC | Age: 75
End: 2025-09-22
Payer: MEDICARE

## (undated) DEVICE — Device

## (undated) DEVICE — TRAY, SURESTEP, SILICONE DRAINAGE BAG, STATLOCK, 16FR

## (undated) DEVICE — RETRIEVAL SYSTEM, MONARCH INZII, 5MM

## (undated) DEVICE — OBTURATOR, BLADELESS , SU

## (undated) DEVICE — ASSEMBLY, STRYKER FLOW 2, SUCTION IRRIGATOR, WITH TIP

## (undated) DEVICE — DRAPE, COLUMN, DAVINCI XI

## (undated) DEVICE — ADHESIVE, SKIN, DERMABOND ADVANCED, 15CM, PEN-STYLE

## (undated) DEVICE — SUTURE, MONOCRYL, 4-0, 27 IN, PS-2, UNDYED

## (undated) DEVICE — GLOVE, SURGICAL, PROTEXIS PI BLUE W/NEUTHERA, 8.0, PF, LF

## (undated) DEVICE — GOWN, ASTOUND, XXL

## (undated) DEVICE — TOWEL PACK, STERILE, 4/PACK, BLUE

## (undated) DEVICE — DRAIN, WOUND, FLAT, HUBLESS, FULL LENGTH PERFORATION, 10 MM X 20 CM, SILICONE

## (undated) DEVICE — DRESSING, GAUZE, DRAIN SPONGE, 6 PLY, EXCILON, 4 X 4 IN, STERILE

## (undated) DEVICE — SOLUTION, IRRIGATION, SODIUM CHLORIDE 0.9%, 1000 ML, POUR BOTTLE

## (undated) DEVICE — CARE KIT, LAPAROSCOPIC, ADVANCED

## (undated) DEVICE — SUTURE, V-LOC 3-0 CV-23 6 GR 180 ABS"

## (undated) DEVICE — DRAPE, ARM XI

## (undated) DEVICE — ACCESS PORT, 8M M, LOW PROFILE W/BLADELESS OPTICAL TIP, 100MM LENGTH

## (undated) DEVICE — GLOVE, SURGICAL, PROTEXIS PI MICRO, 8.0, PF, LF

## (undated) DEVICE — COVER, TIP HOT SHEARS ENDOWRIST

## (undated) DEVICE — SUTURE, V-LOC, 2-0, 12IN, GS-21, GR 180 ABS

## (undated) DEVICE — SUTURE, V-LOC 90, 3-0, CV-20, VIOLET

## (undated) DEVICE — EVACUATOR, WOUND, SUCTION, CLOSED, JACKSON-PRATT, 100 CC, SILICONE

## (undated) DEVICE — SUTURE, VICRYL, 3-0, 27 IN, SH

## (undated) DEVICE — TUBING SET, TRI-LUMEN, FILTERED, F/AIRSEAL

## (undated) DEVICE — SYRINGE, 60 CC, IRRIGATION, PISTON, CATH TIP, W/LUER ADAPTER,DISP

## (undated) DEVICE — DRAPE, SHEET, THREE QUARTER, FAN FOLD, 57 X 77 IN

## (undated) DEVICE — CLIP, LIGATING, HEM-O-LOCK, MLX, LARGE, LF, PURPLE

## (undated) DEVICE — POSITIONING, THE PINK PAD, PIGAZZI SYSTEM

## (undated) DEVICE — SUTURE, V-LOC 90, 2-0, GS21

## (undated) DEVICE — APPLICATOR, CHLORAPREP, W/ORANGE TINT, 26ML

## (undated) DEVICE — SUTURE, VICRYL, 0, 27 IN, UR-6, VIOLET

## (undated) DEVICE — DRAPE, SHEET, UTILITY, NON ABSORBENT, 18 X 26 IN, LF

## (undated) DEVICE — NEEDLE, INSUFFLATION, 13GAX120MM, DISP

## (undated) DEVICE — SEAL, UNIVERSAL, 5-12MM